# Patient Record
Sex: FEMALE | Race: WHITE | NOT HISPANIC OR LATINO | URBAN - NONMETROPOLITAN AREA
[De-identification: names, ages, dates, MRNs, and addresses within clinical notes are randomized per-mention and may not be internally consistent; named-entity substitution may affect disease eponyms.]

---

## 2017-02-24 ENCOUNTER — DOCTOR'S OFFICE (OUTPATIENT)
Dept: URBAN - NONMETROPOLITAN AREA CLINIC 1 | Facility: CLINIC | Age: 22
Setting detail: OPHTHALMOLOGY
End: 2017-02-24
Payer: COMMERCIAL

## 2017-02-24 ENCOUNTER — OPTICAL OFFICE (OUTPATIENT)
Dept: URBAN - NONMETROPOLITAN AREA CLINIC 4 | Facility: CLINIC | Age: 22
Setting detail: OPHTHALMOLOGY
End: 2017-02-24
Payer: COMMERCIAL

## 2017-02-24 DIAGNOSIS — H52.223: ICD-10-CM

## 2017-02-24 DIAGNOSIS — H52.13: ICD-10-CM

## 2017-02-24 PROCEDURE — V2020 VISION SVCS FRAMES PURCHASES: HCPCS | Performed by: OPTOMETRIST

## 2017-02-24 PROCEDURE — V2103 SPHEROCYLINDR 4.00D/12-2.00D: HCPCS | Performed by: OPTOMETRIST

## 2017-02-24 PROCEDURE — 92014 COMPRE OPH EXAM EST PT 1/>: CPT | Performed by: OPTOMETRIST

## 2017-02-24 PROCEDURE — V2750 ANTI-REFLECTIVE COATING: HCPCS | Performed by: OPTOMETRIST

## 2017-02-24 PROCEDURE — V2784 LENS POLYCARB OR EQUAL: HCPCS | Performed by: OPTOMETRIST

## 2017-02-24 ASSESSMENT — REFRACTION_OUTSIDERX
OS_CYLINDER: -0.50
OD_SPHERE: -0.25
OS_AXIS: 080
OD_VA3: 20/
OS_VA1: 20/20
OD_AXIS: 090
OS_VA3: 20/
OS_VA2: 20/
OD_VA1: 20/20
OD_CYLINDER: -0.50
OU_VA: 20/20
OS_SPHERE: -0.25
OD_VA2: 20/

## 2017-02-24 ASSESSMENT — SPHEQUIV_DERIVED
OD_SPHEQUIV: -0.5
OS_SPHEQUIV: -0.625

## 2017-02-24 ASSESSMENT — AXIALLENGTH_DERIVED
OS_AL: 22.5939
OD_AL: 22.5493

## 2017-02-24 ASSESSMENT — REFRACTION_CURRENTRX
OS_OVR_VA: 20/
OS_OVR_VA: 20/
OS_CYLINDER: -0.75
OS_SPHERE: PLANO
OD_AXIS: 90
OD_OVR_VA: 20/
OD_SPHERE: -0.25
OS_AXIS: 79
OD_VPRISM_DIRECTION: SV
OS_VPRISM_DIRECTION: SV
OD_OVR_VA: 20/
OS_OVR_VA: 20/
OD_CYLINDER: -0.50
OD_OVR_VA: 20/

## 2017-02-24 ASSESSMENT — CONFRONTATIONAL VISUAL FIELD TEST (CVF)
OD_FINDINGS: FULL
OS_FINDINGS: FULL

## 2017-02-24 ASSESSMENT — REFRACTION_AUTOREFRACTION
OD_AXIS: 88
OS_CYLINDER: -0.25
OS_SPHERE: -0.50
OD_SPHERE: -0.25
OS_AXIS: 82
OD_CYLINDER: -0.50

## 2017-02-24 ASSESSMENT — REFRACTION_MANIFEST
OD_VA3: 20/
OS_VA1: 20/
OS_VA3: 20/
OU_VA: 20/
OD_VA3: 20/
OS_VA3: 20/
OD_VA1: 20/
OS_VA2: 20/
OD_VA2: 20/
OS_VA2: 20/
OD_VA1: 20/
OS_VA1: 20/
OU_VA: 20/
OD_VA2: 20/

## 2017-02-24 ASSESSMENT — KERATOMETRY
OS_K2POWER_DIOPTERS: 47.50
OD_K2POWER_DIOPTERS: 47.50
OS_AXISANGLE_DEGREES: 035
OD_AXISANGLE_DEGREES: 143
OD_K1POWER_DIOPTERS: 46.50
OS_K1POWER_DIOPTERS: 46.50

## 2017-02-24 ASSESSMENT — VISUAL ACUITY
OS_BCVA: 20/20-1
OD_BCVA: 20/20-1

## 2017-11-07 ENCOUNTER — OFFICE VISIT (OUTPATIENT)
Dept: URGENT CARE | Facility: CLINIC | Age: 22
End: 2017-11-07
Payer: COMMERCIAL

## 2017-11-07 PROCEDURE — 99203 OFFICE O/P NEW LOW 30 MIN: CPT

## 2017-11-07 PROCEDURE — S9088 SERVICES PROVIDED IN URGENT: HCPCS

## 2017-11-08 NOTE — PROGRESS NOTES
Assessment  1  Acute upper respiratory infection (465 9) (J06 9)    Plan  Acute upper respiratory infection    · Avoid exposure to cigarette smoke ; Status:Complete;   Done: 44OQC5303   · Drink at least 6 glasses of water or juice a day ; Status:Complete;   Done: 70XUV4159   · Good hand washing is one of the best ways to control the spread of germs ;  Status:Complete;   Done: 85GHN7422   · The following home treatments may soothe a sore throat ; Status:Complete;   Done:  71YFI0801   · Use a cough medicine to help you get adequate rest ; Status:Complete;   Done:  [id number]    Discussion/Summary  Discussion Summary:   Mucinex or robitussin to thin the mucus  Sinus massage, and eustachian tube massage, as demonstrated every 2-3 hours  Medication Side Effects Reviewed: Possible side effects of new medications were reviewed with the patient/guardian today  Understands and agrees with treatment plan: The treatment plan was reviewed with the patient/guardian  The patient/guardian understands and agrees with the treatment plan   Follow Up Instructions: Follow Up with your Primary Care Provider in 4-5 days  If your symptoms worsen, go to the nearest [place] [address] Emergency Department  Chief Complaint  1  Cold Symptoms   2  Sore Throat  Chief Complaint Free Text Note Form: Pressure in both ears, and sorethroat since sunday      History of Present Illness  Hospital Based Practices Required Assessment:   Abuse And Domestic Violence Screen    Yes, the patient is safe at home  -- The patient states no one is hurting them  Depression And Suicide Screen  No, the patient has not had thoughts of hurting themself  No, the patient has not felt depressed in the past 7 days  Prefered Language is  [place]  Primary Language is  English  Cold Symptoms:   Chris presents with complaints of gradual onset of constant episodes of moderate cold symptoms  Episodes started about 3 days ago     Associated symptoms include nasal congestion,-- runny nose,-- post nasal drainage,-- sore throat,-- dry cough,-- ear pain,-- fever-- and-- chills  Review of Systems  Focused-Female:   Constitutional: as noted in HPI    ENT: as noted in HPI  Cardiovascular: no complaints of slow or fast heart rate, no chest pain, no palpitations, no leg claudication or lower extremity edema  Respiratory: no complaints of shortness of breath, no wheezing, no dyspnea on exertion, no orthopnea or PND  Past Medical History  1  History of  (Q02 33) (N52 124)  Active Problems And Past Medical History Reviewed: The active problems and past medical history were reviewed and updated today  Family History  Family History    1  No pertinent family history  Family History Reviewed: The family history was reviewed and updated today  Social History   · Active smoker (305 1) (F17 200)  Social History Reviewed: The social history was reviewed and updated today  Surgical History  1  Denied: History Of Prior Surgery  Surgical History Reviewed: The surgical history was reviewed and updated today  Current Meds   1  Adderall 20 MG Oral Tablet; Therapy: (0664 313 06 34) to Recorded   2  Fetzima 40 MG Oral Capsule Extended Release 24 Hour; Therapy: (0664 313 06 34) to Recorded   3  Flexeril 10 MG TABS; Therapy: (0664 313 06 34) to Recorded   4  Folic Acid 1 MG Oral Tablet; Therapy: (0664 313 06 34) to Recorded   5  LaMICtal 100 MG Oral Tablet; Therapy: (0664 313 06 34) to Recorded  Medication List Reviewed: The medication list was reviewed and updated today  Allergies  1   No Known Drug Allergies    Vitals  Signs   Recorded: 93KKD4826 10:41AM   Temperature: 98 F  Heart Rate: 500  Systolic: 520  Diastolic: 73  Height: 5 ft 9 in  Weight: 130 lb 4 oz  BMI Calculated: 19 23  BSA Calculated: 1 72  O2 Saturation: 100  Pain Scale: 8    Physical Exam    Constitutional   General appearance: No acute distress, well appearing and well nourished  Ears, Nose, Mouth, and Throat   External inspection of ears and nose: Normal     Otoscopic examination: Tympanic membranes translucent with normal light reflex  Canals patent without erythema  Nasal mucosa, septum, and turbinates: Normal without edema or erythema  Oropharynx: Normal with no erythema, edema, exudate or lesions  Pulmonary   Respiratory effort: No increased work of breathing or signs of respiratory distress  Auscultation of lungs: Clear to auscultation  Cardiovascular   Auscultation of heart: Normal rate and rhythm, normal S1 and S2, without murmurs  Lymphatic   Palpation of lymph nodes in neck: No lymphadenopathy  Message  Return to work or school:   Jacki Yañez is under my professional care   She was seen in my office on 11/7/2017             Signatures   Electronically signed by : DAMARIS Palacios ; Nov 7 2017 11:34AM EST                       (Author)

## 2017-11-26 ENCOUNTER — OFFICE VISIT (OUTPATIENT)
Dept: URGENT CARE | Facility: CLINIC | Age: 22
End: 2017-11-26
Payer: COMMERCIAL

## 2017-11-26 PROCEDURE — 99203 OFFICE O/P NEW LOW 30 MIN: CPT

## 2017-11-26 PROCEDURE — S9088 SERVICES PROVIDED IN URGENT: HCPCS

## 2017-11-27 NOTE — PROGRESS NOTES
Assessment    1  Acute pharyngitis (462) (J02 9)    Plan  Acute pharyngitis    · Amoxicillin 400 MG/5ML Oral Suspension Reconstituted; TAKE 10 ML EVERY 12HOURS UNTIL GONE   · Lidocaine Viscous 2 % Mouth/Throat Solution; USE 5ML EVERY 2 HOURS ASNEEDED    Discussion/Summary  Discussion Summary:   In office strep test was positive  Take medication as prescribed  he could also try taking the viscous lidocaine, gargle and spit  If any worsening of symptoms follow-up with primary care provider for re-evaluation  In any distress please go to emergency room without delay  Medication Side Effects Reviewed: Possible side effects of new medications were reviewed with the patient/guardian today  Understands and agrees with treatment plan: The treatment plan was reviewed with the patient/guardian  The patient/guardian understands and agrees with the treatment plan   Counseling Documentation With Imm: The patient was counseled regarding instructions for management  Follow Up Instructions: Follow Up with your Primary Care Provider in 3-5 days  If your symptoms worsen, go to the nearest [facility] Emergency Department  Chief Complaint    1  Sore Throat  Chief Complaint Free Text Note Form: Patient states started with sore throat yesterday      History of Present Illness  HPI: 38-year-old female presents with a sore throat times 2 days  She states that her pain is currently a 9/10 and she has had elevated temperatures ranging from 100-101 F  she denies any chest pain, shortness of breath, difficulty breathing, abdominal pain, nausea /vomiting /diarrhea  She denies any sick contacts  She denies cough   Hospital Based Practices Required Assessment: Reason DV Screen not done: family at bedside   Depression And Suicide Screen  Reason suicide screen not done: family at bedside  Prefered Language is  english  Primary Language is  english  Review of Systems  Focused-Female:  Constitutional: fever, but-- no chills  ENT: no nosebleeds-- and-- no nasal discharge  Cardiovascular: no chest pain-- and-- no palpitations  Respiratory: no shortness of breath,-- no cough-- and-- no wheezing  Gastrointestinal: no nausea-- and-- no vomiting  ROS Reviewed:   ROS reviewed  Active Problems  1  Acute upper respiratory infection (465 9) (J06 9)   2  Sore throat (462) (J02 9)    Past Medical History  1  History of  (V45 89) (U60 681)   2  History of depression (V11 8) (Z86 59)   3  History of restless legs syndrome (V12 49) (Z86 69)   4  History of Narcolepsy (347 00) (G47 419)  Active Problems And Past Medical History Reviewed: The active problems and past medical history were reviewed and updated today  Family History  Family History    1  No pertinent family history  Family History Reviewed: The family history was reviewed and updated today  Social History   · Active smoker (305 1) (F17 200)  Social History Reviewed: The social history was reviewed and updated today  The social history was reviewed and is unchanged  Surgical History    1  History of  Section   2  Denied: History Of Prior Surgery   3  History of Nose Surgery  Surgical History Reviewed: The surgical history was reviewed and updated today  Current Meds   1  Adderall 20 MG Oral Tablet; Therapy: ([phone]) to Recorded   2  Fetzima 40 MG Oral Capsule Extended Release 24 Hour; Therapy: ([phone]) to Recorded   3  Flexeril 10 MG TABS; Therapy: ([phone]) to Recorded   4  Folic Acid 1 MG Oral Tablet; Therapy: ([phone]) to Recorded   5  LaMICtal 100 MG Oral Tablet; Therapy: ([phone]) to Recorded   6  TraMADol HCl - 50 MG Oral Tablet; Therapy: (Recorded:83Dte4314) to Recorded  Medication List Reviewed: The medication list was reviewed and updated today  Allergies    1   No Known Drug Allergies    Vitals  Signs   Recorded: 15HBM5905 02:16PM   Temperature: 99 3 F, Tympanic  Heart Rate: 136  Respiration: 20  Systolic: 99, RUE, Sitting  Diastolic: 60, RUE, Sitting  Height: 5 ft 9 in  Weight: 128 lb 8 oz  BMI Calculated: 18 98  BSA Calculated: 1 71  O2 Saturation: 100, RA  Pain Scale: 9    Physical Exam   Constitutional  General appearance: No acute distress, well appearing and well nourished  Eyes  Conjunctiva and lids: No swelling, erythema or discharge  Pupils and irises: Equal, round and reactive to light  Ears, Nose, Mouth, and Throat  External inspection of ears and nose: Normal    Otoscopic examination: Tympanic membranes translucent with normal light reflex  Canals patent without erythema  Nasal mucosa, septum, and turbinates: Normal without edema or erythema  Oropharynx: Abnormal  -- bilateral tonsillar exudates and erythema in posterior oropharynx  Pulmonary  Respiratory effort: No increased work of breathing or signs of respiratory distress  Auscultation of lungs: Clear to auscultation  Cardiovascular  Palpation of heart: Normal PMI, no thrills  Auscultation of heart: Normal rate and rhythm, normal S1 and S2, without murmurs  Results/Data  Lab Studies Reviewed:  In office strep is positive      Signatures   Electronically signed by : Kit Sullivan, [facility]; Nov 26 2017  3:18PM EST                       (Author)    Electronically signed by : BENOIT Garcia ; Nov 26 2017  4:02PM EST

## 2018-01-18 NOTE — MISCELLANEOUS
Message  Return to work or school:   Nu Rodriguez is under my professional care   She was seen in my office on 11/7/2017             Signatures   Electronically signed by : DAMARIS Wright ; Nov 7 2017 11:34AM EST                       (Author)

## 2018-04-03 ENCOUNTER — EVALUATION (OUTPATIENT)
Dept: PHYSICAL THERAPY | Facility: CLINIC | Age: 23
End: 2018-04-03
Payer: COMMERCIAL

## 2018-04-03 DIAGNOSIS — M22.2X1 PATELLOFEMORAL PAIN SYNDROME OF BOTH KNEES: Primary | ICD-10-CM

## 2018-04-03 DIAGNOSIS — M22.2X2 PATELLOFEMORAL PAIN SYNDROME OF BOTH KNEES: Primary | ICD-10-CM

## 2018-04-03 PROCEDURE — 97014 ELECTRIC STIMULATION THERAPY: CPT | Performed by: PHYSICAL THERAPIST

## 2018-04-03 PROCEDURE — G8991 OTHER PT/OT GOAL STATUS: HCPCS | Performed by: PHYSICAL THERAPIST

## 2018-04-03 PROCEDURE — G8990 OTHER PT/OT CURRENT STATUS: HCPCS | Performed by: PHYSICAL THERAPIST

## 2018-04-03 PROCEDURE — 97162 PT EVAL MOD COMPLEX 30 MIN: CPT | Performed by: PHYSICAL THERAPIST

## 2018-04-03 PROCEDURE — 97110 THERAPEUTIC EXERCISES: CPT | Performed by: PHYSICAL THERAPIST

## 2018-04-03 RX ORDER — TRAMADOL HYDROCHLORIDE 50 MG/1
50 TABLET ORAL EVERY 6 HOURS PRN
COMMUNITY
End: 2018-04-14

## 2018-04-03 RX ORDER — LAMOTRIGINE 100 MG/1
25 TABLET ORAL DAILY
COMMUNITY
End: 2018-04-14

## 2018-04-03 RX ORDER — CYCLOBENZAPRINE HCL 10 MG
10 TABLET ORAL 3 TIMES DAILY PRN
COMMUNITY

## 2018-04-03 RX ORDER — FOLIC ACID 1 MG/1
TABLET ORAL DAILY
COMMUNITY
End: 2018-04-14

## 2018-04-03 RX ORDER — DEXTROAMPHETAMINE SACCHARATE, AMPHETAMINE ASPARTATE MONOHYDRATE, DEXTROAMPHETAMINE SULFATE AND AMPHETAMINE SULFATE 5; 5; 5; 5 MG/1; MG/1; MG/1; MG/1
20 CAPSULE, EXTENDED RELEASE ORAL EVERY MORNING
COMMUNITY
End: 2019-12-27

## 2018-04-03 NOTE — LETTER
2018    Laura Duran    Patient: Farida Lofton   YOB: 1995   Date of Visit: 4/3/2018     Encounter Diagnosis     ICD-10-CM    1  Patellofemoral pain syndrome of both knees M22 2X1     M22 2X2        Dear Dr Eder Patricia:    Please review the attached Plan of Care from [facility] recent visit  Please verify that you agree therapy should continue by signing the attached document and sending it back to our office  If you have any questions or concerns, please don't hesitate to call  Sincerely,    Anayeli Yanes, PT      Referring Provider:      I certify that I have read the below Plan of Care and certify the need for these services furnished under this plan of treatment while under my care  [facility] Facsimile: [phone]          PT Evaluation     Today's date: 4/3/2018  Patient name: Farida Lofton  : 1995  MRN: [id number]  Referring provider: Caitlin Jones  Dx:   Encounter Diagnosis     ICD-10-CM    1  Patellofemoral pain syndrome of both knees M22 2X1     M22 2X2                   Assessment  Impairments: abnormal gait, abnormal muscle firing, abnormal muscle tone, abnormal movement, activity intolerance, impaired physical strength, lacks appropriate home exercise program, pain with function and weight-bearing intolerance  Functional limitations: Difficulty standing over 1 hour, Difficulty with stairs, difficulty squatting, difficulty with school  Assessment details: Pt  Is a pleasant 23 y o  Female presenting to PT with cc of chronic B anterior knee pain  She is demonstrating objective deficits at IE including reduced strength, mm balance, and joint mechanics with subsequent reduced functional performance  Pt  Demonstrates ss consistent with B PFP secondary to rotational dysfunction causing valgus collapse and patellar maltracking  Pt  Foot pronation and supination are WNL   Pt  Denies any low back impairments and B hip/innominate positioning WNL  Pt  Would benefit from PT interventions to promote improved functional performance and BLE mechanics to reduce impairments  Understanding of Dx/Px/POC: good   Prognosis: good    Goals  ST weeks  -Pt  Will be able to perform 10 squats without pain  -Pt  Will demonstrate ability to perform S/L SLR, showing improved functional hip abduction     LT weeks  -Pt  Will be able to perform DL jump 20x without pain  -Pt  Will be I with HEP at DC  -Pt  Will be pain free with all ADLs      Plan  Planned modality interventions: cryotherapy, unattended electrical stimulation and thermotherapy: hydrocollator packs  Planned therapy interventions: abdominal trunk stabilization, activity modification, balance/weight bearing training, Mendoza taping, manual therapy, joint mobilization, neuromuscular re-education, therapeutic exercise, therapeutic activities, functional ROM exercises and home exercise program  Frequency: 2x week  Duration in weeks: 6  Treatment plan discussed with: patient        Subjective Evaluation    History of Present Illness  Onset date: Approx 3 years ago  Mechanism of injury: Pt  Presents to PT with cc of chronic B anterior knee pain  She notes pain began approx 3 years ago but denies any particular NIRANJAN or trauma  Pt  Visited ortho to address and was referred to ortho surg and has since been referred to PT with eval/treat with quad/hip strengthening  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[employer] and states classes and prolonged walking agitate knees  She also notes that going down stairs seems to be the most troublesome  Pt  Has 10 yo son she is caring for and states bending and lifting him is difficult  She hopes to return to PLOF and normal daily activities pain free     Pain  Current pain ratin  At best pain ratin  At worst pain ratin  Location: B Knees  Quality: sharp, pressure and grinding  Relieving factors: ice and heat  Aggravating factors: stair climbing and standing          Objective     Palpation   Left   Tenderness of the vastus medialis  Right Tenderness of the vastus medialis  Tenderness   Left Knee   Tenderness in the ITB, lateral patella, patellar tendon and quadriceps tendon  Right Knee   Tenderness in the ITB, lateral patella, medial patella and quadriceps tendon  Neurological Testing     Sensation     Knee   Left Knee   Intact: light touch    Right Knee   Intact: light touch     Reflexes   Left   Patellar (L4): normal (2+)    Right   Patellar (L4): normal (2+)    Active Range of Motion   Left Knee   Normal active range of motion    Right Knee   Normal active range of motion    Mobility   Patellar Mobility:   Left Knee   WFL: medial    Hypermobile: left lateral and left superior    Hypomobile: left inferior    Right Knee   WFL: medial  Hypermobile: lateral and superior   Hypomobile: inferior     Patellar Static Positioning   Left Knee: lateral tilt and kyle  Right Knee: lateral tilt and kyle    Strength/Myotome Testing     Left Knee   Flexion: 4  Prone flexion: 4+  Extension: 4+  Quadriceps contraction: fair    Right Knee   Flexion: 4-  Prone flexion: 4  Extension: 4+  Quadriceps contraction: fair    Tests     Left Knee   Positive patellar apprehension, patellar compression and patella-femoral grind  Right Knee   Positive patellar apprehension, patellar compression and patella-femoral grind  Functional Assessment   Squat   Left valgus, left tibial anterior translation beyond toes, right valgus and right tibial anterior translation beyond toes  Forward Step Down 8"   Left Leg  Pain and valgus  Right Leg  Pain and valgus         Flowsheet Rows    Flowsheet Row Most Recent Value   PT/OT G-Codes   Current Score  46   Projected Score  60   FOTO information reviewed  Yes   Assessment Type  Evaluation   G code set  Other PT/OT Primary   Other PT Primary Current Status ()  CK   Other PT Primary Goal Status ()  CK Precautions: None    Daily Treatment Diary     Manual  4/3            PF Mobz -            IASTM -                                                       Exercise Diary  4/3            SLR  20x            S/L Abduction 20x            Clamshells 20x            Brdiges 20x                                                                                                                                                                                                               Mendoza Tape LY @ Start                Modalities  4/3            HV with CP 15 min

## 2018-04-03 NOTE — PROGRESS NOTES
PT Evaluation     Today's date: 4/3/2018  Patient name: [place]  : 1995  MRN: [id number]  Referring provider: Hafsa Cope  Dx:   Encounter Diagnosis     ICD-10-CM    1  Patellofemoral pain syndrome of both knees M22 2X1     M22 2X2                   Assessment  Impairments: abnormal gait, abnormal muscle firing, abnormal muscle tone, abnormal movement, activity intolerance, impaired physical strength, lacks appropriate home exercise program, pain with function and weight-bearing intolerance  Functional limitations: Difficulty standing over 1 hour, Difficulty with stairs, difficulty squatting, difficulty with school  Assessment details: Pt  Is a pleasant 23 y o  Female presenting to PT with cc of chronic B anterior knee pain  She is demonstrating objective deficits at IE including reduced strength, mm balance, and joint mechanics with subsequent reduced functional performance  Pt  Demonstrates ss consistent with B PFP secondary to rotational dysfunction causing valgus collapse and patellar maltracking  Pt  Foot pronation and supination are WNL  Pt  Denies any low back impairments and B hip/innominate positioning WNL  Pt  Would benefit from PT interventions to promote improved functional performance and BLE mechanics to reduce impairments  Understanding of Dx/Px/POC: good   Prognosis: good    Goals  ST weeks  -Pt  Will be able to perform 10 squats without pain  -Pt  Will demonstrate ability to perform S/L SLR, showing improved functional hip abduction     LT weeks  -Pt  Will be able to perform DL jump 20x without pain  -Pt  Will be I with HEP at DC  -Pt   Will be pain free with all ADLs      Plan  Planned modality interventions: cryotherapy, unattended electrical stimulation and thermotherapy: hydrocollator packs  Planned therapy interventions: abdominal trunk stabilization, activity modification, balance/weight bearing training, Mendoza taping, manual therapy, joint mobilization, neuromuscular re-education, therapeutic exercise, therapeutic activities, functional ROM exercises and home exercise program  Frequency: 2x week  Duration in weeks: 6  Treatment plan discussed with: patient        Subjective Evaluation    History of Present Illness  Onset date: Approx 3 years ago  Mechanism of injury: Pt  Presents to PT with cc of chronic B anterior knee pain  She notes pain began approx 3 years ago but denies any particular NIRANJAN or trauma  Pt  Visited ortho to address and was referred to ortho surg and has since been referred to PT with eval/treat with quad/hip strengthening  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 and prolonged walking agitate knees  She also notes that going down stairs seems to be the most troublesome  Pt  Has 8 yo son she is caring for and states bending and lifting him is difficult  She hopes to return to PLOF and normal daily activities pain free  Pain  Current pain ratin  At best pain ratin  At worst pain ratin  Location: B Knees  Quality: sharp, pressure and grinding  Relieving factors: ice and heat  Aggravating factors: stair climbing and standing          Objective     Palpation   Left   Tenderness of the vastus medialis  Right Tenderness of the vastus medialis  Tenderness   Left Knee   Tenderness in the ITB, lateral patella, patellar tendon and quadriceps tendon  Right Knee   Tenderness in the ITB, lateral patella, medial patella and quadriceps tendon       Neurological Testing     Sensation     Knee   Left Knee   Intact: light touch    Right Knee   Intact: light touch     Reflexes   Left   Patellar (L4): normal (2+)    Right   Patellar (L4): normal (2+)    Active Range of Motion   Left Knee   Normal active range of motion    Right Knee   Normal active range of motion    Mobility   Patellar Mobility:   Left Knee   WFL: medial    Hypermobile: left lateral and left superior    Hypomobile: left inferior    Right Knee WFL: medial  Hypermobile: lateral and superior   Hypomobile: inferior     Patellar Static Positioning   Left Knee: lateral tilt and kyle  Right Knee: lateral tilt and kyle    Strength/Myotome Testing     Left Knee   Flexion: 4  Prone flexion: 4+  Extension: 4+  Quadriceps contraction: fair    Right Knee   Flexion: 4-  Prone flexion: 4  Extension: 4+  Quadriceps contraction: fair    Tests     Left Knee   Positive patellar apprehension, patellar compression and patella-femoral grind  Right Knee   Positive patellar apprehension, patellar compression and patella-femoral grind  Functional Assessment   Squat   Left valgus, left tibial anterior translation beyond toes, right valgus and right tibial anterior translation beyond toes  Forward Step Down 8"   Left Leg  Pain and valgus  Right Leg  Pain and valgus         Flowsheet Rows    Flowsheet Row Most Recent Value   PT/OT G-Codes   Current Score  46   Projected Score  60   FOTO information reviewed  Yes   Assessment Type  Evaluation   G code set  Other PT/OT Primary   Other PT Primary Current Status ()  CK   Other PT Primary Goal Status ()  CK          Precautions: None    Daily Treatment Diary     Manual  4/3            PF Mobz -            IASTM -                                                       Exercise Diary  4/3            SLR  20x            S/L Abduction 20x            Clamshells 20x            Brdiges 20x                                                                                                                                                                                                               Mendoza Tape LY @ Start                Modalities  4/3            HV with CP 15 min

## 2018-04-04 ENCOUNTER — TRANSCRIBE ORDERS (OUTPATIENT)
Dept: PHYSICAL THERAPY | Facility: CLINIC | Age: 23
End: 2018-04-04

## 2018-04-04 DIAGNOSIS — M22.2X1 PATELLOFEMORAL DISORDER OF RIGHT KNEE: ICD-10-CM

## 2018-04-04 DIAGNOSIS — M22.2X2 PATELLOFEMORAL DISORDER OF LEFT KNEE: Primary | ICD-10-CM

## 2018-04-05 ENCOUNTER — OFFICE VISIT (OUTPATIENT)
Dept: PHYSICAL THERAPY | Facility: CLINIC | Age: 23
End: 2018-04-05
Payer: COMMERCIAL

## 2018-04-05 DIAGNOSIS — M22.2X1 PATELLOFEMORAL PAIN SYNDROME OF BOTH KNEES: Primary | ICD-10-CM

## 2018-04-05 DIAGNOSIS — M22.2X2 PATELLOFEMORAL PAIN SYNDROME OF BOTH KNEES: Primary | ICD-10-CM

## 2018-04-05 PROCEDURE — 97110 THERAPEUTIC EXERCISES: CPT

## 2018-04-05 PROCEDURE — 97140 MANUAL THERAPY 1/> REGIONS: CPT

## 2018-04-05 PROCEDURE — 97014 ELECTRIC STIMULATION THERAPY: CPT

## 2018-04-05 PROCEDURE — 97112 NEUROMUSCULAR REEDUCATION: CPT

## 2018-04-05 NOTE — PROGRESS NOTES
Daily Note     Today's date: 2018  Patient name: [place]  : 1995  MRN: [id number]  Referring provider: Luise Severance, PA-C  Dx:   Encounter Diagnosis     ICD-10-CM    1  Patellofemoral pain syndrome of both knees M22 2X1     M22 2X2        Start Time: 5901  Stop Time: 1836  Total time in clinic (min): 69 minutes    Subjective: "Both of my knee are pretty sore today "      Objective: See treatment diary below      Assessment:  Patient noted generalized discomfort with IASTM and noted particular selective tissue tension with ITB ROM  Patient would benefit from continued skilled PT intervention to address her remaining deficits  Plan: Continue PT as per plan of care and patient tolerance      Precautions: None     Daily Treatment Diary      Manual  4/3  4/5                   PF Mobz -  AS                   IASTM -  AS                                                                                                 Exercise Diary  4/3  4/5                   SLR  20x  20x                   S/L Abduction 20x  20x                   Clamshells 20x  20x                   Brdiges 20x  30x2''                   fire hydrants (static)    60''                   static clamshell    60''                   static scissor    60''                    S/L ITB stretch    3x30''                    R bike    5'                                                                                                                                                                                                                                                                   Mendoza Tape LY @ Start  NP                         Modalities  4/3  4/5                   HV with CP 15 min  15'

## 2018-04-09 ENCOUNTER — APPOINTMENT (OUTPATIENT)
Dept: PHYSICAL THERAPY | Facility: CLINIC | Age: 23
End: 2018-04-09
Payer: COMMERCIAL

## 2018-04-12 ENCOUNTER — OFFICE VISIT (OUTPATIENT)
Dept: PHYSICAL THERAPY | Facility: CLINIC | Age: 23
End: 2018-04-12
Payer: COMMERCIAL

## 2018-04-12 DIAGNOSIS — M22.2X2 PATELLOFEMORAL PAIN SYNDROME OF BOTH KNEES: Primary | ICD-10-CM

## 2018-04-12 DIAGNOSIS — M22.2X1 PATELLOFEMORAL PAIN SYNDROME OF BOTH KNEES: Primary | ICD-10-CM

## 2018-04-12 PROCEDURE — 97140 MANUAL THERAPY 1/> REGIONS: CPT | Performed by: PHYSICAL THERAPIST

## 2018-04-12 PROCEDURE — 97110 THERAPEUTIC EXERCISES: CPT | Performed by: PHYSICAL THERAPIST

## 2018-04-12 NOTE — PROGRESS NOTES
Daily Note     Today's date: 2018  Patient name: [place]  : 1995  MRN: [id number]  Referring provider: Caitlin Jones PA-C  Dx:   Encounter Diagnosis     ICD-10-CM    1  Patellofemoral pain syndrome of both knees M22 2X1     M22 2X2                   Subjective: Pt  Notes B knees continue to be sore  She states some pain over fibular head which when palpated gives some N/T  Objective: See treatment diary below      Assessment:  Pt  Demonstrates ss of peroneal paresthesias due to exercise putting strain on mm  Pt  Symptoms reduce immediately after palpation  Pt  Responded well to quad stretching and STM as well as Mendoza taping to improve patellar tracking and superior positioning  Plan: Continue PT as per plan of care and patient tolerance      Precautions: None     Daily Treatment Diary      Manual  4/3  4/5  4/12                 PF Mobz -  AS  5 min                 IASTM -  AS  5 min                                                                                               Exercise Diary  4/3  4/5  4/12                 SLR  20x  20x  30x                 S/L Abduction 20x  20x  20x                 Clamshells 20x  20x  30x                 Brdiges 20x  30x2''  30x2" with band and Bosu                 fire hydrants (static)    60''  60"                 static clamshell    60''  30x, or                 static scissor    60''  -                  S/L ITB stretch    3x30''  3x30"                  R bike    5'  8'                  quad Stretch - - 4x30"                                                                                                                                                                                                                                         Mendoza Tape LY @ Start  NP  @ end                       Modalities  4/3  4/5  4/12                 HV with CP 15 min  15'  -

## 2018-04-14 ENCOUNTER — OFFICE VISIT (OUTPATIENT)
Dept: URGENT CARE | Facility: CLINIC | Age: 23
End: 2018-04-14
Payer: COMMERCIAL

## 2018-04-14 VITALS
BODY MASS INDEX: 20.17 KG/M2 | SYSTOLIC BLOOD PRESSURE: 107 MMHG | HEIGHT: 69 IN | TEMPERATURE: 98.3 F | HEART RATE: 132 BPM | OXYGEN SATURATION: 99 % | RESPIRATION RATE: 16 BRPM | WEIGHT: 136.2 LBS | DIASTOLIC BLOOD PRESSURE: 74 MMHG

## 2018-04-14 DIAGNOSIS — J02.9 SORE THROAT: Primary | ICD-10-CM

## 2018-04-14 LAB — S PYO AG THROAT QL: NEGATIVE

## 2018-04-14 PROCEDURE — 99213 OFFICE O/P EST LOW 20 MIN: CPT | Performed by: FAMILY MEDICINE

## 2018-04-14 PROCEDURE — S9088 SERVICES PROVIDED IN URGENT: HCPCS | Performed by: FAMILY MEDICINE

## 2018-04-14 RX ORDER — FOLIC ACID 1 MG/1
TABLET ORAL
COMMUNITY
End: 2018-04-14

## 2018-04-14 RX ORDER — CYCLOBENZAPRINE HCL 10 MG
TABLET ORAL
COMMUNITY
End: 2018-04-14

## 2018-04-14 RX ORDER — CYCLOBENZAPRINE HCL 10 MG
10 TABLET ORAL
COMMUNITY
End: 2018-04-14

## 2018-04-14 RX ORDER — LAMOTRIGINE 100 MG/1
TABLET ORAL
COMMUNITY
End: 2019-12-27

## 2018-04-14 RX ORDER — TRAMADOL HYDROCHLORIDE 50 MG/1
50 TABLET ORAL
COMMUNITY
Start: 2018-04-12

## 2018-04-14 RX ORDER — LEVOMILNACIPRAN HYDROCHLORIDE 40 MG/1
CAPSULE, EXTENDED RELEASE ORAL
COMMUNITY
Start: 2018-04-10

## 2018-04-14 RX ORDER — ALBUTEROL SULFATE 2.5 MG/3ML
2.5 SOLUTION RESPIRATORY (INHALATION)
COMMUNITY
Start: 2016-10-03

## 2018-04-14 RX ORDER — TRETINOIN 0.5 MG/G
CREAM TOPICAL
COMMUNITY
Start: 2017-04-12 | End: 2019-12-27

## 2018-04-14 RX ORDER — FOLIC ACID 1 MG/1
1 TABLET ORAL
COMMUNITY

## 2018-04-14 RX ORDER — LAMOTRIGINE 150 MG/1
150 TABLET ORAL DAILY
Refills: 2 | COMMUNITY
Start: 2018-04-09

## 2018-04-14 RX ORDER — ALBUTEROL SULFATE 90 UG/1
2 AEROSOL, METERED RESPIRATORY (INHALATION)
COMMUNITY
Start: 2016-10-03

## 2018-04-14 RX ORDER — DEXTROAMPHETAMINE SACCHARATE, AMPHETAMINE ASPARTATE, DEXTROAMPHETAMINE SULFATE AND AMPHETAMINE SULFATE 5; 5; 5; 5 MG/1; MG/1; MG/1; MG/1
TABLET ORAL
COMMUNITY
End: 2019-12-27

## 2018-04-14 NOTE — PROGRESS NOTES
Assessment/Plan:    Recommend supportive care, fluids and rest   Warm saltwater gargles or gargle with Listerine  Follow up with family doctor if no better in 3-5 days  Diagnoses and all orders for this visit:    Sore throat  -     POCT rapid strepA    Other orders  -     Acetaminophen 500 MG; Take 500 mg by mouth  -     albuterol (PROVENTIL HFA,VENTOLIN HFA) 90 mcg/act inhaler; Inhale 2 puffs  -     albuterol (2 5 mg/3 mL) 0 083 % nebulizer solution; Inhale 2 5 mg  -     Levomilnacipran HCl ER (FETZIMA) 40 MG CP24; Take by mouth  -     Discontinue: cyclobenzaprine (FLEXERIL) 10 mg tablet; Take by mouth  -     lamoTRIgine (LaMICtal) 150 MG tablet; Take 150 mg by mouth daily  -     FETZIMA 40 MG CP24;   -     tretinoin (REFISSA) 0 05 % cream; Apply topically  -     amphetamine-dextroamphetamine (ADDERALL, 20MG,) 20 mg tablet; Take by mouth  -     Discontinue: cyclobenzaprine (FLEXERIL) 10 mg tablet; Take 10 mg by mouth  -     Discontinue: folic acid (FOLVITE) 1 mg tablet; Take by mouth  -     folic acid (FOLVITE) 1 mg tablet; Take 1 mg by mouth  -     lamoTRIgine (LAMICTAL) 100 mg tablet; Take by mouth  -     traMADol (ULTRAM) 50 mg tablet; Take 50 mg by mouth          Subjective:      Patient ID: Malika Chery is a 23 y o  female  Patient presents with:  Sore Throat: pt c/o sore throat times 2 days  The following portions of the patient's history were reviewed and updated as appropriate: allergies, current medications, past family history, past medical history, past social history, past surgical history and problem list     Review of Systems   Constitutional: Negative  HENT: Positive for sore throat  Negative for congestion, ear discharge, ear pain, facial swelling, mouth sores, postnasal drip, sinus pain and sinus pressure  Eyes: Negative  Respiratory: Negative  Cardiovascular: Negative  Gastrointestinal: Negative  Endocrine: Negative  Genitourinary: Negative  Musculoskeletal: Negative  Skin: Negative  Allergic/Immunologic: Negative  Neurological: Negative  Hematological: Negative  Psychiatric/Behavioral: Negative  All other systems reviewed and are negative  Objective:      /74 (BP Location: Right arm, Patient Position: Sitting, Cuff Size: Adult)   Pulse (!) 132   Temp 98 3 °F (36 8 °C) (Tympanic)   Resp 16   Ht 5' 9" (1 753 m)   Wt 61 8 kg (136 lb 3 2 oz)   SpO2 99%   BMI 20 11 kg/m²          Physical Exam   Constitutional: She is oriented to person, place, and time  She appears well-developed and well-nourished  HENT:   Head: Normocephalic and atraumatic  Right Ear: External ear normal    Left Ear: External ear normal    Nose: Nose normal    Mouth/Throat: Oropharynx is clear and moist    Eyes: Conjunctivae and EOM are normal  Pupils are equal, round, and reactive to light  Neck: Normal range of motion  Neck supple  Cardiovascular: Normal rate, regular rhythm and normal heart sounds  Pulmonary/Chest: Effort normal and breath sounds normal    Abdominal: Soft  Bowel sounds are normal    Musculoskeletal: Normal range of motion  Neurological: She is alert and oriented to person, place, and time  She has normal reflexes  Skin: Skin is warm and dry  Psychiatric: She has a normal mood and affect  Her behavior is normal    Nursing note and vitals reviewed

## 2018-04-17 ENCOUNTER — APPOINTMENT (OUTPATIENT)
Dept: PHYSICAL THERAPY | Facility: CLINIC | Age: 23
End: 2018-04-17
Payer: COMMERCIAL

## 2018-04-20 ENCOUNTER — OFFICE VISIT (OUTPATIENT)
Dept: PHYSICAL THERAPY | Facility: CLINIC | Age: 23
End: 2018-04-20
Payer: COMMERCIAL

## 2018-04-20 DIAGNOSIS — M22.2X2 PATELLOFEMORAL PAIN SYNDROME OF BOTH KNEES: Primary | ICD-10-CM

## 2018-04-20 DIAGNOSIS — M22.2X1 PATELLOFEMORAL PAIN SYNDROME OF BOTH KNEES: Primary | ICD-10-CM

## 2018-04-20 PROCEDURE — 97140 MANUAL THERAPY 1/> REGIONS: CPT | Performed by: PHYSICAL THERAPIST

## 2018-04-20 PROCEDURE — 97110 THERAPEUTIC EXERCISES: CPT | Performed by: PHYSICAL THERAPIST

## 2018-04-23 NOTE — PROGRESS NOTES
Daily Note     Today's date: 2018  Patient name: [place]  : 1995  MRN: [id number]  Referring provider: Nichole Hinton PA-C  Dx:   Encounter Diagnosis     ICD-10-CM    1  Patellofemoral pain syndrome of both knees M22 2X1     M22 2X2                   Subjective: Pt  States Mendoza taping seems to have helped with daily pain, especially with stairs  She states compliance to HEP  Objective: See treatment diary below      Assessment:  [place] was progressed with PT interventions, focusing on appropriate technique and intensity  Pt  Required frequent cuing from therapist to complete  Pt  Would benefit from continued physical therapy to promote improved activity tolerance and function  Plan: Continue PT as per plan of care and patient tolerance      Precautions: None     Daily Treatment Diary      Manual  4/3  4/5  4/12  4/20               PF Mobz -  AS  5 min  5 min               IASTM -  AS  5 min  10 min                                                                                             Exercise Diary  4/3  4/5  4/12  4/20               SLR  20x  20x  30x  30x               S/L Abduction 20x  20x  20x  30x               Clamshells 20x  20x  30x  30x               Brdiges 20x  30x2''  30x2" with band and Bosu  30x2" with band and bosu               fire hydrants (static)    60''  60"  -               static clamshell    60''  30x, or  -               static scissor    60''  - "                S/L ITB stretch    3x30''  3x30"  4x30"                R bike    10'  8'  10'                quad Stretch - - 4x30"  4x30"                                                                                                                                                                                                                                       Mendoza Tape LY @ Start  NP  @ end @ end                     Modalities  4/3  4/5  4/12                 HV with CP 15 min  15'  -

## 2018-05-09 ENCOUNTER — OFFICE VISIT (OUTPATIENT)
Dept: PHYSICAL THERAPY | Facility: CLINIC | Age: 23
End: 2018-05-09
Payer: COMMERCIAL

## 2018-05-09 DIAGNOSIS — M22.2X1 PATELLOFEMORAL PAIN SYNDROME OF BOTH KNEES: Primary | ICD-10-CM

## 2018-05-09 DIAGNOSIS — M22.2X2 PATELLOFEMORAL PAIN SYNDROME OF BOTH KNEES: Primary | ICD-10-CM

## 2018-05-09 PROCEDURE — 97110 THERAPEUTIC EXERCISES: CPT | Performed by: PHYSICAL THERAPIST

## 2018-05-09 PROCEDURE — 97140 MANUAL THERAPY 1/> REGIONS: CPT | Performed by: PHYSICAL THERAPIST

## 2018-05-09 NOTE — PROGRESS NOTES
Daily Note     Today's date: 2018  Patient name: [place]  : 1995  MRN: [id number]  Referring provider: Zay Cornell PA-C  Dx:   Encounter Diagnosis     ICD-10-CM    1  Patellofemoral pain syndrome of both knees M22 2X1     M22 2X2                   Subjective: Pt states that she is still experiencing anterior and lateral knee pain that is made worse when sitting at school, walking, and navigating stairs  Pt notes being compliant with her HEP and that the exercises have been easy         Objective: See treatment diary below    Precautions: None     Daily Treatment Diary      Manual  4/3  4/5  4/12  4/20  5/9             PF Mobz -  AS  5 min  5 min  5'             IASTM -  AS  5 min  10 min  10'                                                                                           Exercise Diary  4/3  4/5  4/12  4/20  5/9             SLR  20x  20x  30x  30x  2#, 4x10             S/L Abduction 20x  20x  20x  30x  2#, 3x12             Clamshells 20x  20x  30x  30x  4x8, 5s hold             Brdiges 20x  30x2''  30x2" with band and Bosu  30x2" with band and bosu  30x2" w/ band             fire hydrants (static)    60''  60"  -  -             static clamshell    60''  30x, or  -  -             static scissor    60''  - "  -              S/L ITB stretch    3x30''  3x30"  4x30"  HEP              R bike    10'  8'  10'  10'              quad Stretch - - 4x30"  4x30"  HEP              Seated t-band ER         3x8, 3s hold                                                                                                                                                                                                             Mendoza Tape LY @ Start  NP  @ end @ end  @ end                   Modalities  4/3  4/5  4/12  5/9               HV with CP 15 min  15'  -                                                                         Assessment: Pt able to progress in open chain exercises challenging thigh and hip musculature  Pt required minimal cueing to facilitate proper technique during hip abduction exercise  Pt completed exercise with moderate fatigue and no increase in resting pain  Pt was educated on completion of stretches to complete for HEP  Pt would benefit from continued PT to increase knee strength and stability, reduce pain, and increase level of function  Plan: Continue per plan of care  Pt  Was treated by RENEE Caruso under the supervision of Carvin Baumgarten, PT, DPT

## 2018-05-14 ENCOUNTER — OFFICE VISIT (OUTPATIENT)
Dept: PHYSICAL THERAPY | Facility: CLINIC | Age: 23
End: 2018-05-14
Payer: COMMERCIAL

## 2018-05-14 DIAGNOSIS — M22.2X2 PATELLOFEMORAL PAIN SYNDROME OF BOTH KNEES: Primary | ICD-10-CM

## 2018-05-14 DIAGNOSIS — M22.2X1 PATELLOFEMORAL PAIN SYNDROME OF BOTH KNEES: Primary | ICD-10-CM

## 2018-05-14 PROCEDURE — 97110 THERAPEUTIC EXERCISES: CPT | Performed by: PHYSICAL THERAPIST

## 2018-05-14 PROCEDURE — 97140 MANUAL THERAPY 1/> REGIONS: CPT | Performed by: PHYSICAL THERAPIST

## 2018-05-14 NOTE — PROGRESS NOTES
Daily Note     Today's date: 2018  Patient name: [place]  : 1995  MRN: [id number]  Referring provider: Lilyan Cranker, PA-C  Dx:   Encounter Diagnosis     ICD-10-CM    1  Patellofemoral pain syndrome of both knees M22 2X1     M22 2X2                   Subjective: Pt states that her knees have been bothering her after sitting for long periods of time during car rides over the weekend         Objective: See treatment diary below    Precautions: None     Daily Treatment Diary      Manual  4/3  4/5  4/12  4/20  5/9  5/14           PF Mobz -  AS  5 min  5 min  5'  5'           IASTM -  AS  5 min  10 min  10'  10'                                                                                         Exercise Diary  4/3  4/5  4/12  4/20  5/9  5/14           SLR  20x  20x  30x  30x  2#, 4x10  2#, 4x10           S/L Abduction 20x  20x  20x  30x  2#, 3x12  2#, 3x12           Clamshells 20x  20x  30x  30x  4x8, 5s hold  4x8x5"           Brdiges 20x  30x2''  30x2" with band and Bosu  30x2" with band and bosu  30x2" w/ band  30x2" w/ band           fire hydrants (static)    60''  60"  -  -             static clamshell    60''  30x, or  -  -             static scissor    60''  - "  -              S/L ITB stretch    3x30''  3x30"  4x30"  HEP  HEP            R bike    5'  8'  10'  10'  10'            quad Stretch - - 4x30"  4x30"  HEP  HEP            Seated t-band ER         3x8, 3s hold  3x8x3", red            S/L figure 8            2x30"                                                                                                                                                                                   Mendoza Tape LY @ Start  NP  @ end @ end  @ end  -                  Modalities  4/3  4/5  4/12  5/9  5/14             HV with CP 15 min  15'  -  -  -                                                                     Assessment: Patellar mobility restrictions present inferiorly and laterally, noted during mobilizations  Pt completed open chain exercises with on reports of pain  Pt should continue to challenged in open chain hip and knee strengthening according to pt tolerance  Pt would benefit from continued PT to reduce pain and increase level of function  Plan: Continue per plan of care

## 2018-05-21 ENCOUNTER — OFFICE VISIT (OUTPATIENT)
Dept: PHYSICAL THERAPY | Facility: CLINIC | Age: 23
End: 2018-05-21
Payer: COMMERCIAL

## 2018-05-21 DIAGNOSIS — M22.2X2 PATELLOFEMORAL PAIN SYNDROME OF BOTH KNEES: Primary | ICD-10-CM

## 2018-05-21 DIAGNOSIS — M22.2X1 PATELLOFEMORAL PAIN SYNDROME OF BOTH KNEES: Primary | ICD-10-CM

## 2018-05-21 PROCEDURE — 97110 THERAPEUTIC EXERCISES: CPT | Performed by: PHYSICAL THERAPIST

## 2018-05-21 NOTE — PROGRESS NOTES
Daily Note     Today's date: 2018  Patient name: [place]  : 1995  MRN: [id number]  Referring provider: Jeffery De La Cruz PA-C  Dx:   Encounter Diagnosis     ICD-10-CM    1  Patellofemoral pain syndrome of both knees M22 2X1     M22 2X2                   Subjective: Pt states that her knees hurt and have still been bothering her         Objective: See treatment diary below    Precautions: None     Daily Treatment Diary      Manual  4/3  4/5  4/12  4/20  5/9  5/14  5/21         PF Mobz -  AS  5 min  5 min  5'  5'  -         IASTM -  AS  5 min  10 min  10'  10'  -                                                                                       Exercise Diary  4/3  4/5  4/12  4/20  5/9  5/14  5/21         SLR  20x  20x  30x  30x  2#, 4x10  2#, 4x10  2 5#, 4x10         S/L Abduction 20x  20x  20x  30x  2#, 3x12  2#, 3x12  2 5#, 4x10         Clamshells 20x  20x  30x  30x  4x8, 5s hold  4x8x5"  4x8x5"         Bridges 20x  30x2''  30x2" with band and Bosu  30x2" with band and bosu  30x2" w/ band  30x2" w/ band  -         fire hydrants (static)    60''  60"  -  -    -         static clamshell    60''  30x, or  -  -    -         static scissor    60''  - "  -    -          S/L ITB stretch    3x30''  3x30"  4x30"  HEP  HEP  -          R bike    5'  8'  10'  10'  10'  10'          quad Stretch - - 4x30"  4x30"  HEP  HEP  -          Seated t-band ER         3x8, 3s hold  3x8x3", red  -          S/L figure 8            2x30"  -          Ball squat             25#x3'                                                                                                                                                         Mendoza Tape LY @ Start  NP  @ end @ end  @ end  -                  Modalities  4/3  4/5  4/12  5/9  5/14  5/21           HV with CP 15 min  15'  -  -  -  -                                                                   Assessment: Pt was able to progress to closed chain strengthening exercises for lower extremity  Pt reported pain limited ROM during exercise, but pt was able to increase ROM as exercise duration progressed  Closed and open chain exercises to be progressed according to patient tolerance  Pt would benefit from continued PT to reduce pain and increase level of function  Plan: Continue per plan of care

## 2018-06-07 NOTE — PROGRESS NOTES
Discharge Note    Today's date: 2018  Patient name: [place]  : 1995  MRN: [id number]  Referring provider: Anaya Morrissey PA-C  Dx:   Encounter Diagnosis     ICD-10-CM    1  Patellofemoral pain syndrome of both knees M22 2X1     M22 2X2        Start Time: 1030  Stop Time: 1130  Total time in clinic (min): 60 minutes    Subjective: Pt  Has not returned to PT or scheduled new appointments  Assessment:   [place] has continued with PT 2-3x/week, progressing as tolerated with treatment for   1  Patellofemoral pain syndrome of both knees       PT will be discontinued at this time due to non compliance  Pt  Was left message to f/u prn         Plan: DC at this time

## 2019-02-22 ENCOUNTER — OFFICE VISIT (OUTPATIENT)
Dept: URGENT CARE | Facility: CLINIC | Age: 24
End: 2019-02-22
Payer: COMMERCIAL

## 2019-02-22 VITALS
RESPIRATION RATE: 16 BRPM | WEIGHT: 143 LBS | BODY MASS INDEX: 21.18 KG/M2 | OXYGEN SATURATION: 99 % | TEMPERATURE: 97.2 F | HEART RATE: 156 BPM | SYSTOLIC BLOOD PRESSURE: 133 MMHG | DIASTOLIC BLOOD PRESSURE: 90 MMHG | HEIGHT: 69 IN

## 2019-02-22 DIAGNOSIS — J06.9 VIRAL UPPER RESPIRATORY TRACT INFECTION: Primary | ICD-10-CM

## 2019-02-22 LAB — S PYO AG THROAT QL: NEGATIVE

## 2019-02-22 PROCEDURE — S9088 SERVICES PROVIDED IN URGENT: HCPCS | Performed by: EMERGENCY MEDICINE

## 2019-02-22 PROCEDURE — 99213 OFFICE O/P EST LOW 20 MIN: CPT | Performed by: EMERGENCY MEDICINE

## 2019-02-22 RX ORDER — PREDNISONE 10 MG/1
TABLET ORAL
Qty: 27 TABLET | Refills: 0 | Status: SHIPPED | OUTPATIENT
Start: 2019-02-22 | End: 2019-12-27

## 2019-02-22 NOTE — PATIENT INSTRUCTIONS
You have been diagnosed with a Viral Upper Respiratory infection and your symptoms should resolve over the next 7 to 10 days with the treatments recommended today   If they do not, it is possible that you have developed a bacterial infection and you should return  If you were to take an antibiotic while you are still in the viral stage, you will not get better any faster, but could kill off the good germs in your body as well as make the germs in you resistant to the antibiotic  Take an expectorant - guaifenesin should be the only ingredient - during the day, and the cough suppressant (ex  Robitussin DM or Tessalon) if needed at night only  Take Zinc 12 5 to 15 mg every 2 - 3 hrs while awake for the next few days   You may take Cold Reinaldo (13 3 mg of Zinc) or split a 25 mg Zinc tablet or lozenge in two or a 50 mg into four to get the proper dose   The total daily dose of Zinc should exceed 75 mg per day   You may also take a decongestant like Sudafed, unless you have hypertension or cardiac disease  Hold any NSAID's like Ibuprofen (Advil), Naprosyn (Aleve), etc while on steroids like Medrol or Prednisone  If you are diabetic, you should also adhere strictly to your diet and monitor your blood sugar closely while on the steroids as discussed  Upper Respiratory Infection   AMBULATORY CARE:   An upper respiratory infection  is also called a common cold  It can affect your nose, throat, ears, and sinuses  Common signs and symptoms include the following:  Cold symptoms are usually worst for the first 3 to 5 days  You may have any of the following:  · Runny or stuffy nose    · Sneezing and coughing    · Sore throat or hoarseness    · Red, watery, and sore eyes    · Fatigue     · Chills and fever    · Headache, body aches, or sore muscles  Seek care immediately if:   · You have chest pain or trouble breathing  Contact your healthcare provider if:   · You have a fever over 102ºF (39°C)      · Your sore throat gets worse or you see white or yellow spots in your throat  · Your symptoms get worse after 3 to 5 days or your cold is not better in 14 days  · You have a rash anywhere on your skin  · You have large, tender lumps in your neck  · You have thick, green or yellow drainage from your nose  · You cough up thick yellow, green, or bloody mucus  · You have vomiting for more than 24 hours and cannot keep fluids down  · You have a bad earache  · You have questions or concerns about your condition or care  Treatment for a cold: There is no cure for the common cold  Colds are caused by viruses and do not get better with antibiotics  Most people get better in 7 to 14 days  You may continue to cough for 2 to 3 weeks  The following may help decrease your symptoms:  · Decongestants  help reduce nasal congestion and help you breathe more easily  If you take decongestant pills, they may make you feel restless or not able to sleep  Do not use decongestant sprays for more than a few days  · Cough suppressants  help reduce coughing  Ask your healthcare provider which type of cough medicine is best for you  · NSAIDs , such as ibuprofen, help decrease swelling, pain, and fever  NSAIDs can cause stomach bleeding or kidney problems in certain people  If you take blood thinner medicine, always ask your healthcare provider if NSAIDs are safe for you  Always read the medicine label and follow directions  · Acetaminophen  decreases pain and fever  It is available without a doctor's order  Ask how much to take and how often to take it  Follow directions  Read the labels of all other medicines you are using to see if they also contain acetaminophen, or ask your doctor or pharmacist  Acetaminophen can cause liver damage if not taken correctly  Do not use more than 4 grams (4,000 milligrams) total of acetaminophen in one day  Manage your cold:   · Rest as much as possible  Slowly start to do more each day  · Drink more liquids as directed  Liquids will help thin and loosen mucus so you can cough it up  Liquids will also help prevent dehydration  Liquids that help prevent dehydration include water, fruit juice, and broth  Do not drink liquids that contain caffeine  Caffeine can increase your risk for dehydration  Ask your healthcare provider how much liquid to drink each day  · Soothe a sore throat  Gargle with warm salt water  This helps your sore throat feel better  Make salt water by dissolving ¼ teaspoon salt in 1 cup warm water  You may also suck on hard candy or throat lozenges  You may use a sore throat spray  · Use a humidifier or vaporizer  Use a cool mist humidifier or a vaporizer to increase air moisture in your home  This may make it easier for you to breathe and help decrease your cough  · Use saline nasal drops as directed  These help relieve congestion  · Apply petroleum-based jelly around the outside of your nostrils  This can decrease irritation from blowing your nose  · Do not smoke  Nicotine and other chemicals in cigarettes and cigars can make your symptoms worse  They can also cause infections such as bronchitis or pneumonia  Ask your healthcare provider for information if you currently smoke and need help to quit  E-cigarettes or smokeless tobacco still contain nicotine  Talk to your healthcare provider before you use these products  Prevent spreading your cold to others:   · Try to stay away from other people during the first 2 to 3 days of your cold when it is more easily spread  · Do not share food or drinks  · Do not share hand towels with household members  · Wash your hands often, especially after you blow your nose  Turn away from other people and cover your mouth and nose with a tissue when you sneeze or cough  Follow up with your healthcare provider as directed:  Write down your questions so you remember to ask them during your visits     © 2017 [employer] [facility] Information is for End User's use only and may not be sold, redistributed or otherwise used for commercial purposes  All illustrations and images included in CareNotes® are the copyrighted property of [employer]  or Anoop [employer]  The above information is an  only  It is not intended as medical advice for individual conditions or treatments  Talk to your doctor, nurse or pharmacist before following any medical regimen to see if it is safe and effective for you

## 2019-02-22 NOTE — PROGRESS NOTES
[facility]        NAME: Carlos Maunel is a 20 y o  female  : 1995    MRN: [id number]  DATE: 2019  TIME: 2:03 PM    Assessment and Plan   Viral upper respiratory tract infection [J06 9]  1  Viral upper respiratory tract infection           Patient Instructions     Patient Instructions     You have been diagnosed with a Viral Upper Respiratory infection and your symptoms should resolve over the next 7 to 10 days with the treatments recommended today   If they do not, it is possible that you have developed a bacterial infection and you should return  If you were to take an antibiotic while you are still in the viral stage, you will not get better any faster, but could kill off the good germs in your body as well as make the germs in you resistant to the antibiotic  Take an expectorant - guaifenesin should be the only ingredient - during the day, and the cough suppressant (ex  Robitussin DM or Tessalon) if needed at night only  Take Zinc 12 5 to 15 mg every 2 - 3 hrs while awake for the next few days   You may take Cold Reinaldo (13 3 mg of Zinc) or split a 25 mg Zinc tablet or lozenge in two or a 50 mg into four to get the proper dose   The total daily dose of Zinc should exceed 75 mg per day   You may also take a decongestant like Sudafed, unless you have hypertension or cardiac disease  Hold any NSAID's like Ibuprofen (Advil), Naprosyn (Aleve), etc while on steroids like Medrol or Prednisone  If you are diabetic, you should also adhere strictly to your diet and monitor your blood sugar closely while on the steroids as discussed  Upper Respiratory Infection   AMBULATORY CARE:   An upper respiratory infection  is also called a common cold  It can affect your nose, throat, ears, and sinuses  Common signs and symptoms include the following:  Cold symptoms are usually worst for the first 3 to 5 days   You may have any of the following:  · Runny or stuffy nose    · Sneezing and coughing    · Sore throat or hoarseness    · Red, watery, and sore eyes    · Fatigue     · Chills and fever    · Headache, body aches, or sore muscles  Seek care immediately if:   · You have chest pain or trouble breathing  Contact your healthcare provider if:   · You have a fever over 102ºF (39°C)  · Your sore throat gets worse or you see white or yellow spots in your throat  · Your symptoms get worse after 3 to 5 days or your cold is not better in 14 days  · You have a rash anywhere on your skin  · You have large, tender lumps in your neck  · You have thick, green or yellow drainage from your nose  · You cough up thick yellow, green, or bloody mucus  · You have vomiting for more than 24 hours and cannot keep fluids down  · You have a bad earache  · You have questions or concerns about your condition or care  Treatment for a cold: There is no cure for the common cold  Colds are caused by viruses and do not get better with antibiotics  Most people get better in 7 to 14 days  You may continue to cough for 2 to 3 weeks  The following may help decrease your symptoms:  · Decongestants  help reduce nasal congestion and help you breathe more easily  If you take decongestant pills, they may make you feel restless or not able to sleep  Do not use decongestant sprays for more than a few days  · Cough suppressants  help reduce coughing  Ask your healthcare provider which type of cough medicine is best for you  · NSAIDs , such as ibuprofen, help decrease swelling, pain, and fever  NSAIDs can cause stomach bleeding or kidney problems in certain people  If you take blood thinner medicine, always ask your healthcare provider if NSAIDs are safe for you  Always read the medicine label and follow directions  · Acetaminophen  decreases pain and fever  It is available without a doctor's order  Ask how much to take and how often to take it  Follow directions   Read the labels of all other medicines you are using to see if they also contain acetaminophen, or ask your doctor or pharmacist  Acetaminophen can cause liver damage if not taken correctly  Do not use more than 4 grams (4,000 milligrams) total of acetaminophen in one day  Manage your cold:   · Rest as much as possible  Slowly start to do more each day  · Drink more liquids as directed  Liquids will help thin and loosen mucus so you can cough it up  Liquids will also help prevent dehydration  Liquids that help prevent dehydration include water, fruit juice, and broth  Do not drink liquids that contain caffeine  Caffeine can increase your risk for dehydration  Ask your healthcare provider how much liquid to drink each day  · Soothe a sore throat  Gargle with warm salt water  This helps your sore throat feel better  Make salt water by dissolving ¼ teaspoon salt in 1 cup warm water  You may also suck on hard candy or throat lozenges  You may use a sore throat spray  · Use a humidifier or vaporizer  Use a cool mist humidifier or a vaporizer to increase air moisture in your home  This may make it easier for you to breathe and help decrease your cough  · Use saline nasal drops as directed  These help relieve congestion  · Apply petroleum-based jelly around the outside of your nostrils  This can decrease irritation from blowing your nose  · Do not smoke  Nicotine and other chemicals in cigarettes and cigars can make your symptoms worse  They can also cause infections such as bronchitis or pneumonia  Ask your healthcare provider for information if you currently smoke and need help to quit  E-cigarettes or smokeless tobacco still contain nicotine  Talk to your healthcare provider before you use these products  Prevent spreading your cold to others:   · Try to stay away from other people during the first 2 to 3 days of your cold when it is more easily spread  · Do not share food or drinks       · Do not share hand towels with household members  · Wash your hands often, especially after you blow your nose  Turn away from other people and cover your mouth and nose with a tissue when you sneeze or cough  Follow up with your healthcare provider as directed:  Write down your questions so you remember to ask them during your visits  © 2017 [employer] Information is for End User's use only and may not be sold, redistributed or otherwise used for commercial purposes  All illustrations and images included in CareNotes® are the copyrighted property of [employer]  or [employer]  The above information is an  only  It is not intended as medical advice for individual conditions or treatments  Talk to your doctor, nurse or pharmacist before following any medical regimen to see if it is safe and effective for you  Follow up with PCP in 3-5 days  Proceed to  ER if symptoms worsen  Chief Complaint     Chief Complaint   Patient presents with    Sore Throat     Onset about 7 days ago         History of Present Illness       Patient complains of sore throat, cough and congestion for the past 7 days  Review of Systems   Review of Systems   Constitutional: Negative for chills and fever  HENT: Positive for congestion, rhinorrhea, sinus pressure and sore throat  Negative for trouble swallowing and voice change  Respiratory: Positive for cough  Negative for chest tightness, shortness of breath and wheezing  Cardiovascular: Negative for chest pain           Current Medications       Current Outpatient Medications:     Acetaminophen 500 MG, Take 500 mg by mouth, Disp: , Rfl:     albuterol (2 5 mg/3 mL) 0 083 % nebulizer solution, Inhale 2 5 mg, Disp: , Rfl:     albuterol (PROVENTIL HFA,VENTOLIN HFA) 90 mcg/act inhaler, Inhale 2 puffs, Disp: , Rfl:     amphetamine-dextroamphetamine (ADDERALL XR) 20 MG 24 hr capsule, Take 20 mg by mouth every morning, Disp: , Rfl:    cyclobenzaprine (FLEXERIL) 10 mg tablet, Take 10 mg by mouth 3 (three) times a day as needed for muscle spasms, Disp: , Rfl:     folic acid (FOLVITE) 1 mg tablet, Take 1 mg by mouth, Disp: , Rfl:     lamoTRIgine (LAMICTAL) 100 mg tablet, Take by mouth, Disp: , Rfl:     lamoTRIgine (LaMICtal) 150 MG tablet, Take 150 mg by mouth daily, Disp: , Rfl: 2    Levomilnacipran HCl ER (FETZIMA) 40 MG CP24, Take by mouth, Disp: , Rfl:     traMADol (ULTRAM) 50 mg tablet, Take 50 mg by mouth, Disp: , Rfl:     tretinoin (REFISSA) 0 05 % cream, Apply topically, Disp: , Rfl:     amphetamine-dextroamphetamine (ADDERALL, 20MG,) 20 mg tablet, Take by mouth, Disp: , Rfl:     FETZIMA 40 MG CP24, , Disp: , Rfl:     Current Allergies     Allergies as of 2019    (No Known Allergies)            The following portions of the patient's history were reviewed and updated as appropriate: allergies, current medications, past family history, past medical history, past social history, past surgical history and problem list      Past Medical History:   Diagnosis Date    Anxiety        Past Surgical History:   Procedure Laterality Date     SECTION         History reviewed  No pertinent family history  Medications have been verified  Objective   /90   Pulse (!) 156   Temp (!) 97 2 °F (36 2 °C) (Tympanic)   Resp 16   Ht 5' 9" (1 753 m)   Wt 64 9 kg (143 lb)   LMP 2019 (Approximate)   SpO2 99%   BMI 21 12 kg/m²        Physical Exam     Physical Exam   Constitutional: She is oriented to person, place, and time  She appears well-developed and well-nourished  No distress  HENT:   Head: Normocephalic and atraumatic  Right Ear: Tympanic membrane and external ear normal    Left Ear: Tympanic membrane and external ear normal    Nose: Mucosal edema present  Mouth/Throat: Posterior oropharyngeal erythema present  No oropharyngeal exudate or tonsillar abscesses  Neck: Neck supple     Cardiovascular: Normal rate and regular rhythm  Pulmonary/Chest: Effort normal    Abdominal: Soft  Bowel sounds are normal    Neurological: She is alert and oriented to person, place, and time  Skin: Skin is warm and dry  Nursing note and vitals reviewed

## 2019-12-27 ENCOUNTER — APPOINTMENT (EMERGENCY)
Dept: CT IMAGING | Facility: HOSPITAL | Age: 24
End: 2019-12-27
Payer: COMMERCIAL

## 2019-12-27 ENCOUNTER — HOSPITAL ENCOUNTER (EMERGENCY)
Facility: HOSPITAL | Age: 24
Discharge: HOME | End: 2019-12-27
Attending: EMERGENCY MEDICINE | Admitting: EMERGENCY MEDICINE
Payer: COMMERCIAL

## 2019-12-27 ENCOUNTER — OFFICE VISIT (OUTPATIENT)
Dept: URGENT CARE | Facility: CLINIC | Age: 24
End: 2019-12-27
Payer: COMMERCIAL

## 2019-12-27 VITALS
SYSTOLIC BLOOD PRESSURE: 98 MMHG | HEIGHT: 69 IN | RESPIRATION RATE: 18 BRPM | BODY MASS INDEX: 22.22 KG/M2 | WEIGHT: 150 LBS | OXYGEN SATURATION: 99 % | TEMPERATURE: 97.1 F | HEART RATE: 124 BPM | DIASTOLIC BLOOD PRESSURE: 52 MMHG

## 2019-12-27 VITALS
BODY MASS INDEX: 21.39 KG/M2 | DIASTOLIC BLOOD PRESSURE: 66 MMHG | HEART RATE: 110 BPM | RESPIRATION RATE: 18 BRPM | WEIGHT: 144.84 LBS | TEMPERATURE: 98.4 F | SYSTOLIC BLOOD PRESSURE: 102 MMHG | OXYGEN SATURATION: 99 %

## 2019-12-27 DIAGNOSIS — R07.89 OTHER CHEST PAIN: Primary | ICD-10-CM

## 2019-12-27 DIAGNOSIS — R00.0 TACHYCARDIA: ICD-10-CM

## 2019-12-27 DIAGNOSIS — R07.9 CHEST PAIN: Primary | ICD-10-CM

## 2019-12-27 LAB
ANION GAP SERPL CALCULATED.3IONS-SCNC: 11 MMOL/L (ref 4–13)
ATRIAL RATE: 106 BPM
BASOPHILS # BLD AUTO: 0.03 THOUSANDS/ΜL (ref 0–0.1)
BASOPHILS NFR BLD AUTO: 1 % (ref 0–1)
BUN SERPL-MCNC: 7 MG/DL (ref 5–25)
CALCIUM SERPL-MCNC: 9.1 MG/DL (ref 8.3–10.1)
CHLORIDE SERPL-SCNC: 99 MMOL/L (ref 100–108)
CO2 SERPL-SCNC: 26 MMOL/L (ref 21–32)
CREAT SERPL-MCNC: 0.84 MG/DL (ref 0.6–1.3)
EOSINOPHIL # BLD AUTO: 0.38 THOUSAND/ΜL (ref 0–0.61)
EOSINOPHIL NFR BLD AUTO: 6 % (ref 0–6)
ERYTHROCYTE [DISTWIDTH] IN BLOOD BY AUTOMATED COUNT: 12 % (ref 11.6–15.1)
EXT PREG TEST URINE: NEGATIVE
EXT. CONTROL ED NAV: NORMAL
GFR SERPL CREATININE-BSD FRML MDRD: 96 ML/MIN/1.73SQ M
GLUCOSE SERPL-MCNC: 103 MG/DL (ref 65–140)
HCT VFR BLD AUTO: 41.8 % (ref 34.8–46.1)
HGB BLD-MCNC: 14.8 G/DL (ref 11.5–15.4)
IMM GRANULOCYTES # BLD AUTO: 0.01 THOUSAND/UL (ref 0–0.2)
IMM GRANULOCYTES NFR BLD AUTO: 0 % (ref 0–2)
LYMPHOCYTES # BLD AUTO: 2.02 THOUSANDS/ΜL (ref 0.6–4.47)
LYMPHOCYTES NFR BLD AUTO: 33 % (ref 14–44)
MCH RBC QN AUTO: 33.3 PG (ref 26.8–34.3)
MCHC RBC AUTO-ENTMCNC: 35.4 G/DL (ref 31.4–37.4)
MCV RBC AUTO: 94 FL (ref 82–98)
MONOCYTES # BLD AUTO: 0.23 THOUSAND/ΜL (ref 0.17–1.22)
MONOCYTES NFR BLD AUTO: 4 % (ref 4–12)
NEUTROPHILS # BLD AUTO: 3.45 THOUSANDS/ΜL (ref 1.85–7.62)
NEUTS SEG NFR BLD AUTO: 56 % (ref 43–75)
NRBC BLD AUTO-RTO: 0 /100 WBCS
P AXIS: 54 DEGREES
PLATELET # BLD AUTO: 268 THOUSANDS/UL (ref 149–390)
PMV BLD AUTO: 9.2 FL (ref 8.9–12.7)
POTASSIUM SERPL-SCNC: 3.9 MMOL/L (ref 3.5–5.3)
PR INTERVAL: 130 MS
QRS AXIS: 82 DEGREES
QRSD INTERVAL: 78 MS
QT INTERVAL: 328 MS
QTC INTERVAL: 435 MS
RBC # BLD AUTO: 4.45 MILLION/UL (ref 3.81–5.12)
SODIUM SERPL-SCNC: 136 MMOL/L (ref 136–145)
T WAVE AXIS: 53 DEGREES
VENTRICULAR RATE: 106 BPM
WBC # BLD AUTO: 6.12 THOUSAND/UL (ref 4.31–10.16)

## 2019-12-27 PROCEDURE — S9088 SERVICES PROVIDED IN URGENT: HCPCS | Performed by: EMERGENCY MEDICINE

## 2019-12-27 PROCEDURE — 80048 BASIC METABOLIC PNL TOTAL CA: CPT | Performed by: EMERGENCY MEDICINE

## 2019-12-27 PROCEDURE — 99284 EMERGENCY DEPT VISIT MOD MDM: CPT | Performed by: EMERGENCY MEDICINE

## 2019-12-27 PROCEDURE — 36415 COLL VENOUS BLD VENIPUNCTURE: CPT | Performed by: EMERGENCY MEDICINE

## 2019-12-27 PROCEDURE — 99285 EMERGENCY DEPT VISIT HI MDM: CPT

## 2019-12-27 PROCEDURE — 99213 OFFICE O/P EST LOW 20 MIN: CPT | Performed by: EMERGENCY MEDICINE

## 2019-12-27 PROCEDURE — 96360 HYDRATION IV INFUSION INIT: CPT

## 2019-12-27 PROCEDURE — 71275 CT ANGIOGRAPHY CHEST: CPT

## 2019-12-27 PROCEDURE — 93010 ELECTROCARDIOGRAM REPORT: CPT | Performed by: INTERNAL MEDICINE

## 2019-12-27 PROCEDURE — 81025 URINE PREGNANCY TEST: CPT | Performed by: EMERGENCY MEDICINE

## 2019-12-27 PROCEDURE — 93005 ELECTROCARDIOGRAM TRACING: CPT

## 2019-12-27 PROCEDURE — 85025 COMPLETE CBC W/AUTO DIFF WBC: CPT | Performed by: EMERGENCY MEDICINE

## 2019-12-27 RX ADMIN — IOHEXOL 85 ML: 350 INJECTION, SOLUTION INTRAVENOUS at 16:36

## 2019-12-27 RX ADMIN — SODIUM CHLORIDE 1000 ML: 0.9 INJECTION, SOLUTION INTRAVENOUS at 15:33

## 2019-12-27 NOTE — ED PROVIDER NOTES
History  Chief Complaint   Patient presents with    Chest Pain     Patient has sharp chest pain starting last night at ,pain began while patient was relaxing at home,  denies N/V/SOB     Intermittent chest pains recurred last night   Sent from urgent care for evaluation for PE secondary to tachycardia  Patient notes tachycardia ongoing for the past few years, so Cardiology and instructed to discontinue smoking, using caffeine and begin exercising-but did none of these things and heart rate ranges from 120-160  Denies hemoptysis  No history of venous thromboembolism      Chest Pain   Pain location:  L chest  Pain quality: sharp    Pain radiates to:  Does not radiate  Pain radiates to the back: no    Pain severity:  Mild  Onset quality:  Sudden  Timing:  Intermittent  Progression:  Unchanged  Chronicity:  New  Context: breathing    Associated symptoms: no abdominal pain, no fever and no shortness of breath        Prior to Admission Medications   Prescriptions Last Dose Informant Patient Reported? Taking?    Acetaminophen 500 MG   Yes No   Sig: Take 500 mg by mouth   FETZIMA 40 MG CP24   Yes No   Levomilnacipran HCl ER (FETZIMA) 40 MG CP24   Yes No   Sig: Take by mouth   albuterol (2 5 mg/3 mL) 0 083 % nebulizer solution   Yes No   Sig: Inhale 2 5 mg   albuterol (PROVENTIL HFA,VENTOLIN HFA) 90 mcg/act inhaler   Yes No   Sig: Inhale 2 puffs   cyclobenzaprine (FLEXERIL) 10 mg tablet   Yes No   Sig: Take 10 mg by mouth 3 (three) times a day as needed for muscle spasms   folic acid (FOLVITE) 1 mg tablet   Yes No   Sig: Take 1 mg by mouth   lamoTRIgine (LaMICtal) 150 MG tablet   Yes No   Sig: Take 150 mg by mouth daily   traMADol (ULTRAM) 50 mg tablet   Yes No   Sig: Take 50 mg by mouth      Facility-Administered Medications: None       Past Medical History:   Diagnosis Date    Anxiety     Depression     Narcolepsy     Tachycardia        Past Surgical History:   Procedure Laterality Date     SECTION      NOSE SURGERY         Family History   Problem Relation Age of Onset    No Known Problems Mother     No Known Problems Father      I have reviewed and agree with the history as documented  Social History     Tobacco Use    Smoking status: Current Every Day Smoker     Packs/day: 0 50     Years: 8 00     Pack years: 4 00    Smokeless tobacco: Never Used   Substance Use Topics    Alcohol use: Yes     Frequency: Monthly or less    Drug use: Never        Review of Systems   Constitutional: Negative for fever  Respiratory: Negative for shortness of breath  Cardiovascular: Positive for chest pain  Gastrointestinal: Negative for abdominal pain  All other systems reviewed and are negative  Physical Exam  Physical Exam   Constitutional: She is oriented to person, place, and time  She appears well-developed and well-nourished  HENT:   Head: Normocephalic and atraumatic  Mouth/Throat: Oropharynx is clear and moist    Eyes: Pupils are equal, round, and reactive to light  Conjunctivae and EOM are normal    Neck: Neck supple  Cardiovascular: Regular rhythm, normal heart sounds and intact distal pulses  Tachycardia present  Left anterior inferior chest wall without deformity or overlying skin changes   Pulmonary/Chest: Effort normal and breath sounds normal    Abdominal: Soft  Bowel sounds are normal  She exhibits no distension  There is no tenderness  Musculoskeletal: Normal range of motion  Neurological: She is alert and oriented to person, place, and time  No cranial nerve deficit  Coordination normal    Skin: Skin is warm and dry  Psychiatric: She has a normal mood and affect  Her behavior is normal  Judgment and thought content normal    Nursing note and vitals reviewed        Vital Signs  ED Triage Vitals [12/27/19 1513]   Temperature Pulse Respirations Blood Pressure SpO2   98 4 °F (36 9 °C) (!) 114 21 125/87 97 %      Temp Source Heart Rate Source Patient Position - Orthostatic VS BP Location FiO2 (%)   Oral Monitor Sitting Right arm --      Pain Score       --           Vitals:    12/27/19 1513   BP: 125/87   Pulse: (!) 114   Patient Position - Orthostatic VS: Sitting         Visual Acuity      ED Medications  Medications   sodium chloride 0 9 % bolus 1,000 mL (0 mL Intravenous Stopped 12/27/19 1703)   iohexol (OMNIPAQUE) 350 MG/ML injection (SINGLE-DOSE) 100 mL (85 mL Intravenous Given 12/27/19 1636)       Diagnostic Studies  Results Reviewed     Procedure Component Value Units Date/Time    Basic metabolic panel [[id number]]  (Abnormal) Collected:  12/27/19 1532    Lab Status:  Final result Specimen:  Blood from Arm, Left Updated:  12/27/19 1618     Sodium 136 mmol/L      Potassium 3 9 mmol/L      Chloride 99 mmol/L      CO2 26 mmol/L      ANION GAP 11 mmol/L      BUN 7 mg/dL      Creatinine 0 84 mg/dL      Glucose 103 mg/dL      Calcium 9 1 mg/dL      eGFR 96 ml/min/1 73sq m     Narrative:       [place] guidelines for Chronic Kidney Disease (CKD):     Stage 1 with normal or high GFR (GFR > 90 mL/min/1 73 square meters)    Stage 2 Mild CKD (GFR = 60-89 mL/min/1 73 square meters)    Stage 3A Moderate CKD (GFR = 45-59 mL/min/1 73 square meters)    Stage 3B Moderate CKD (GFR = 30-44 mL/min/1 73 square meters)    Stage 4 Severe CKD (GFR = 15-29 mL/min/1 73 square meters)    Stage 5 End Stage CKD (GFR <15 mL/min/1 73 square meters)  Note: GFR calculation is accurate only with a steady state creatinine    CBC and differential [[id number]] Collected:  12/27/19 1532    Lab Status:  Final result Specimen:  Blood from Arm, Left Updated:  12/27/19 1550     WBC 6 12 Thousand/uL      RBC 4 45 Million/uL      Hemoglobin 14 8 g/dL      Hematocrit 41 8 %      MCV 94 fL      MCH 33 3 pg      MCHC 35 4 g/dL      RDW 12 0 %      MPV 9 2 fL      Platelets 290 Thousands/uL      nRBC 0 /100 WBCs      Neutrophils Relative 56 %      Immat GRANS % 0 %      Lymphocytes Relative 33 % Monocytes Relative 4 %      Eosinophils Relative 6 %      Basophils Relative 1 %      Neutrophils Absolute 3 45 Thousands/µL      Immature Grans Absolute 0 01 Thousand/uL      Lymphocytes Absolute 2 02 Thousands/µL      Monocytes Absolute 0 23 Thousand/µL      Eosinophils Absolute 0 38 Thousand/µL      Basophils Absolute 0 03 Thousands/µL     POCT pregnancy, urine [[id number]]  (Normal) Resulted:  12/27/19 1544    Lab Status:  Final result Updated:  12/27/19 1544     EXT PREG TEST UR (Ref: Negative) NEGATIVE     Control VALID                 CTA ED chest PE study   Final Result by Lolita Barrios MD (12/27 1649)      No pulmonary embolus  No acute pulmonary disease  Workstation performed: [id number]                    Procedures  ECG 12 Lead Documentation Only  Date/Time: 12/27/2019 3:46 PM  Performed by: Kingsley Hicks DO  Authorized by: Kingsley Hicks DO     Comments:      3:41 p m   normal axis normal intervals no ST elevation or depression             ED Course  ED Course as of Dec 27 1741   Fri Dec 27, 2019   1738 Feels well, comfortable  Discussed resulted agreeable with discharge, will return immediately for worsening symptoms, follow up with her physician within a brief period                                  MDM      Disposition  Final diagnoses:   Chest pain   Tachycardia     Time reflects when diagnosis was documented in both MDM as applicable and the Disposition within this note     Time User Action Codes Description Comment    12/27/2019  5:39 PM Stanley Mills Add [R07 9] Chest pain     12/27/2019  5:39 PM Stanley Mills Add [R00 0] Tachycardia       ED Disposition     ED Disposition Condition Date/Time Comment    Discharge Stable Fri Dec 27, 2019  5:39 PM [address] [place] [address] discharge to home/self care              Follow-up Information     Follow up With Specialties Details Why Contact Info    Mikaela Zimmerman MD Internal Medicine Schedule an appointment as soon as possible for a visit in 1 week  [address]  [phone]            Patient's Medications   Discharge Prescriptions    No medications on file     No discharge procedures on file      ED Provider  Electronically Signed by           Marcelo Castano DO  12/27/19 9489

## 2019-12-27 NOTE — PROGRESS NOTES
[address] [facility]        NAME: Beth Cross is a 30 y o  female  : 1995    MRN: [id number]  DATE: 2019  TIME: 2:16 PM    Assessment and Plan   Other chest pain [R07 89]  1  Other chest pain  Ambulatory Referral to Emergency Medicine    Transfer to other facility   2  Tachycardia  Ambulatory Referral to Emergency Medicine    Transfer to other facility     I offered to send patient to the ER by ambulance but she refuses  She wants to have her mother drive her there  Patient Instructions     There are no Patient Instructions on file for this visit  Follow up with PCP in 3-5 days  Proceed to  ER if symptoms worsen  Chief Complaint     Chief Complaint   Patient presents with    Chest Pain     left lower chest pain when taking a deep breath since last night         History of Present Illness       Patient complains of pleuritic left-sided chest pain since last night  Pain began suddenly  She denies associated shortness of breath  She denies history of PE or DVT  She denies any recent calf pain or swelling  She is a smoker and is on estrogens  Review of Systems   Review of Systems   Constitutional: Negative for chills, fatigue and fever  HENT: Negative for congestion  Respiratory: Negative for cough, shortness of breath and wheezing  Cardiovascular: Positive for chest pain and palpitations  Gastrointestinal: Negative for vomiting  Skin: Negative for rash  Neurological: Negative for syncope and headaches           Current Medications       Current Outpatient Medications:     Acetaminophen 500 MG, Take 500 mg by mouth, Disp: , Rfl:     albuterol (2 5 mg/3 mL) 0 083 % nebulizer solution, Inhale 2 5 mg, Disp: , Rfl:     albuterol (PROVENTIL HFA,VENTOLIN HFA) 90 mcg/act inhaler, Inhale 2 puffs, Disp: , Rfl:     cyclobenzaprine (FLEXERIL) 10 mg tablet, Take 10 mg by mouth 3 (three) times a day as needed for muscle spasms, Disp: , Rfl:     FETZIMA 40 MG CP24, , Disp: , Rfl:     folic acid (FOLVITE) 1 mg tablet, Take 1 mg by mouth, Disp: , Rfl:     lamoTRIgine (LaMICtal) 150 MG tablet, Take 150 mg by mouth daily, Disp: , Rfl: 2    traMADol (ULTRAM) 50 mg tablet, Take 50 mg by mouth, Disp: , Rfl:     Levomilnacipran HCl ER (FETZIMA) 40 MG CP24, Take by mouth, Disp: , Rfl:     Current Allergies     Allergies as of 2019    (No Known Allergies)            The following portions of the patient's history were reviewed and updated as appropriate: allergies, current medications, past family history, past medical history, past social history, past surgical history and problem list      Past Medical History:   Diagnosis Date    Anxiety     Depression     Narcolepsy     Tachycardia        Past Surgical History:   Procedure Laterality Date     SECTION      NOSE SURGERY         Family History   Problem Relation Age of Onset    No Known Problems Mother     No Known Problems Father          Medications have been verified  Objective   BP 98/52   Pulse (!) 124   Temp (!) 97 1 °F (36 2 °C) (Tympanic)   Resp 18   Ht 5' 9" (1 753 m)   Wt 68 kg (150 lb)   LMP 2019   SpO2 99%   BMI 22 15 kg/m²        Physical Exam     Physical Exam   Constitutional: She is oriented to person, place, and time  She appears well-developed and well-nourished  HENT:   Head: Normocephalic and atraumatic  Eyes: Pupils are equal, round, and reactive to light  Conjunctivae and EOM are normal    Neck: Normal range of motion  Neck supple  Cardiovascular: Regular rhythm, normal heart sounds and intact distal pulses  Tachycardia present  Exam reveals no gallop and no friction rub  No murmur heard  Pulmonary/Chest: Effort normal and breath sounds normal    Musculoskeletal: She exhibits no tenderness  Neurological: She is alert and oriented to person, place, and time  Skin: Skin is warm and dry  No rash noted  Nursing note and vitals reviewed

## 2019-12-27 NOTE — DISCHARGE INSTRUCTIONS
Return immediately for worsening symptoms  Tylenol 1000 mg every 6 hours as needed  Advil 40 mg every 6 hours as needed

## 2020-03-19 ENCOUNTER — DOCTOR'S OFFICE (OUTPATIENT)
Dept: URBAN - NONMETROPOLITAN AREA CLINIC 2 | Facility: CLINIC | Age: 25
Setting detail: OPHTHALMOLOGY
End: 2020-03-19
Payer: COMMERCIAL

## 2020-03-19 ENCOUNTER — RX ONLY (RX ONLY)
Age: 25
End: 2020-03-19

## 2020-03-19 ENCOUNTER — OPTICAL OFFICE (OUTPATIENT)
Dept: URBAN - NONMETROPOLITAN AREA CLINIC 5 | Facility: CLINIC | Age: 25
Setting detail: OPHTHALMOLOGY
End: 2020-03-19
Payer: COMMERCIAL

## 2020-03-19 DIAGNOSIS — H52.223: ICD-10-CM

## 2020-03-19 DIAGNOSIS — H52.13: ICD-10-CM

## 2020-03-19 PROBLEM — Z01.00 GOOD OCULAR HEALTH OU: Status: ACTIVE | Noted: 2020-03-19

## 2020-03-19 PROCEDURE — V2025 EYEGLASSES DELUX FRAMES: HCPCS | Performed by: OPTOMETRIST

## 2020-03-19 PROCEDURE — V2784 LENS POLYCARB OR EQUAL: HCPCS | Performed by: OPTOMETRIST

## 2020-03-19 PROCEDURE — V2799 MISC VISION ITEM OR SERVICE: HCPCS | Performed by: OPTOMETRIST

## 2020-03-19 PROCEDURE — V2102 SINGL VISN SPHERE 7.12-20.00: HCPCS | Performed by: OPTOMETRIST

## 2020-03-19 PROCEDURE — 92004 COMPRE OPH EXAM NEW PT 1/>: CPT | Performed by: OPTOMETRIST

## 2020-03-19 PROCEDURE — V2020 VISION SVCS FRAMES PURCHASES: HCPCS | Performed by: OPTOMETRIST

## 2020-03-19 ASSESSMENT — REFRACTION_CURRENTRX
OS_SPHERE: -0.25
OS_CYLINDER: -0.50
OD_CYLINDER: -0.50
OD_SPHERE: -0.25
OD_VPRISM_DIRECTION: SV
OS_AXIS: 081
OS_OVR_VA: 20/
OD_AXIS: 095
OS_VPRISM_DIRECTION: SV
OD_OVR_VA: 20/

## 2020-03-19 ASSESSMENT — VISUAL ACUITY
OS_BCVA: 20/25-1
OD_BCVA: 20/25

## 2020-03-19 ASSESSMENT — SPHEQUIV_DERIVED
OD_SPHEQUIV: -0.625
OS_SPHEQUIV: -0.625
OS_SPHEQUIV: -0.625
OD_SPHEQUIV: -0.625

## 2020-03-19 ASSESSMENT — KERATOMETRY
OD_K1POWER_DIOPTERS: 46.50
OD_K2POWER_DIOPTERS: 47.75
OS_AXISANGLE_DEGREES: 027
OS_K2POWER_DIOPTERS: 47.50
OD_AXISANGLE_DEGREES: 061
OS_K1POWER_DIOPTERS: 46.75

## 2020-03-19 ASSESSMENT — REFRACTION_MANIFEST
OD_VA1: 20/20
OD_AXIS: 105
OD_SPHERE: -0.25
OS_AXIS: 070
OS_VA2: 20/20
OD_VA2: 20/20
OU_VA: 20/20
OD_CYLINDER: -0.75
OS_CYLINDER: -0.75
OS_SPHERE: -0.25
OS_VA1: 20/20

## 2020-03-19 ASSESSMENT — AXIALLENGTH_DERIVED
OS_AL: 22.5519
OS_AL: 22.5519
OD_AL: 22.5519
OD_AL: 22.5519

## 2020-03-19 ASSESSMENT — REFRACTION_AUTOREFRACTION
OS_CYLINDER: -0.75
OD_SPHERE: -0.25
OD_CYLINDER: -0.75
OD_AXIS: 105
OS_SPHERE: -0.25
OS_AXIS: 069

## 2020-03-19 ASSESSMENT — CONFRONTATIONAL VISUAL FIELD TEST (CVF)
OD_FINDINGS: FULL
OS_FINDINGS: FULL

## 2021-06-03 ENCOUNTER — APPOINTMENT (OUTPATIENT)
Dept: URGENT CARE | Facility: CLINIC | Age: 26
End: 2021-06-03

## 2021-06-03 PROCEDURE — 90715 TDAP VACCINE 7 YRS/> IM: CPT

## 2021-08-09 ENCOUNTER — APPOINTMENT (OUTPATIENT)
Dept: LAB | Facility: CLINIC | Age: 26
End: 2021-08-09
Payer: COMMERCIAL

## 2021-08-09 DIAGNOSIS — Z02.1 PHYSICAL EXAM, PRE-EMPLOYMENT: ICD-10-CM

## 2021-08-09 PROCEDURE — 86480 TB TEST CELL IMMUN MEASURE: CPT

## 2021-08-09 PROCEDURE — 36415 COLL VENOUS BLD VENIPUNCTURE: CPT

## 2021-08-11 LAB
GAMMA INTERFERON BACKGROUND BLD IA-ACNC: 0.02 IU/ML
M TB IFN-G BLD-IMP: NEGATIVE
M TB IFN-G CD4+ BCKGRND COR BLD-ACNC: 0 IU/ML
M TB IFN-G CD4+ BCKGRND COR BLD-ACNC: 0.01 IU/ML
MITOGEN IGNF BCKGRD COR BLD-ACNC: >10 IU/ML

## 2021-09-01 ENCOUNTER — OFFICE VISIT (OUTPATIENT)
Dept: URGENT CARE | Facility: CLINIC | Age: 26
End: 2021-09-01
Payer: COMMERCIAL

## 2021-09-01 VITALS
BODY MASS INDEX: 21.47 KG/M2 | WEIGHT: 150 LBS | DIASTOLIC BLOOD PRESSURE: 83 MMHG | RESPIRATION RATE: 18 BRPM | HEART RATE: 113 BPM | HEIGHT: 70 IN | SYSTOLIC BLOOD PRESSURE: 139 MMHG | TEMPERATURE: 98.9 F | OXYGEN SATURATION: 99 %

## 2021-09-01 DIAGNOSIS — G89.29 CHRONIC PAIN OF RIGHT KNEE: Primary | ICD-10-CM

## 2021-09-01 DIAGNOSIS — M25.461 EFFUSION OF RIGHT KNEE: ICD-10-CM

## 2021-09-01 DIAGNOSIS — M25.561 CHRONIC PAIN OF RIGHT KNEE: Primary | ICD-10-CM

## 2021-09-01 PROCEDURE — 96372 THER/PROPH/DIAG INJ SC/IM: CPT | Performed by: PHYSICIAN ASSISTANT

## 2021-09-01 PROCEDURE — 99213 OFFICE O/P EST LOW 20 MIN: CPT | Performed by: PHYSICIAN ASSISTANT

## 2021-09-01 RX ORDER — KETOROLAC TROMETHAMINE 30 MG/ML
30 INJECTION, SOLUTION INTRAMUSCULAR; INTRAVENOUS ONCE
Status: COMPLETED | OUTPATIENT
Start: 2021-09-01 | End: 2021-09-01

## 2021-09-01 RX ADMIN — KETOROLAC TROMETHAMINE 30 MG: 30 INJECTION, SOLUTION INTRAMUSCULAR; INTRAVENOUS at 15:19

## 2021-09-01 NOTE — PROGRESS NOTES
[facility]        NAME: Kirill Green is a 27 y o  female  : 1995    MRN: [id number]  DATE: 2021  TIME: 4:28 PM    Assessment and Plan   Chronic pain of right knee [M25 561, G89 29]  1  Chronic pain of right knee  ketorolac (TORADOL) injection 30 mg   2  Effusion of right knee       Effusion vs tendinitis vs chronic knee pain    Patient Instructions   Rest, ice, compression, elevation  Over-the-counter Tylenol ibuprofen for pain  Activities as tolerated  Make follow-up appointment with your orthopedic doctor  Follow up with PCP in 3-5 days  Proceed to  ER if symptoms worsen  Chief Complaint     Chief Complaint   Patient presents with    Knee Pain     Right knee pain since monday feels like knee cap shukri pop         History of Present Illness       Patient is a 28-year-old female with no significant past medical history presents the office complaining of right knee pain for 3 days  The pain is rated 9/10 located to the superior aspect of the patella which is worse with full extension and full flexion  Patient states she feels like her knee cap full popped or slip out of place  Has history of hyper mobile joints and chronic knee pain which has been evaluated multiple times by Orthopedics  She has had various injections but they continued to refuse surgery stating her abnormalities are not significant enough  However, patient states this pain is different  Denies any injury or increase physical activity  Admits to swelling but no ecchymosis, warmth, fevers, or chills  Review of Systems   Review of Systems   Musculoskeletal: Positive for arthralgias and joint swelling  Skin: Negative for color change           Current Medications       Current Outpatient Medications:     Acetaminophen 500 MG, Take 500 mg by mouth, Disp: , Rfl:     albuterol (2 5 mg/3 mL) 0 083 % nebulizer solution, Inhale 2 5 mg, Disp: , Rfl:     albuterol (PROVENTIL HFA,VENTOLIN HFA) 90 mcg/act inhaler, Inhale 2 puffs, Disp: , Rfl:     cyclobenzaprine (FLEXERIL) 10 mg tablet, Take 10 mg by mouth 3 (three) times a day as needed for muscle spasms, Disp: , Rfl:     FETZIMA 40 MG CP24, , Disp: , Rfl:     folic acid (FOLVITE) 1 mg tablet, Take 1 mg by mouth, Disp: , Rfl:     lamoTRIgine (LaMICtal) 150 MG tablet, Take 150 mg by mouth daily, Disp: , Rfl: 2    Levomilnacipran HCl ER (FETZIMA) 40 MG CP24, Take by mouth, Disp: , Rfl:     traMADol (ULTRAM) 50 mg tablet, Take 50 mg by mouth, Disp: , Rfl:   No current facility-administered medications for this visit  Current Allergies     Allergies as of 2021 - Reviewed 2019   Allergen Reaction Noted    Diclofenac Rash 2020    Latex Hives, Itching, and Rash 2019            The following portions of the patient's history were reviewed and updated as appropriate: allergies, current medications, past family history, past medical history, past social history, past surgical history and problem list      Past Medical History:   Diagnosis Date    Anxiety     Depression     Narcolepsy     Tachycardia        Past Surgical History:   Procedure Laterality Date     SECTION      NOSE SURGERY         Family History   Problem Relation Age of Onset    No Known Problems Mother     No Known Problems Father          Medications have been verified  Objective   /83   Pulse (!) 113   Temp 98 9 °F (37 2 °C)   Resp 18   Ht 5' 10" (1 778 m)   Wt 68 kg (150 lb)   LMP 08/10/2021   SpO2 99%   BMI 21 52 kg/m²   Patient's last menstrual period was 08/10/2021  Physical Exam     Physical Exam  Vitals and nursing note reviewed  Constitutional:       Appearance: She is well-developed  HENT:      Head: Normocephalic and atraumatic        Right Ear: External ear normal       Left Ear: External ear normal       Nose: Nose normal    Eyes:      General: Lids are normal       Conjunctiva/sclera: Conjunctivae normal  Musculoskeletal:      Right knee: Swelling, effusion and bony tenderness (patella) present  No erythema or ecchymosis  Decreased range of motion  Tenderness (Quadriceps tendon) present over the medial joint line and MCL  No LCL laxity, MCL laxity, ACL laxity or PCL laxity  Normal alignment, normal meniscus and normal patellar mobility  Instability Tests: Anterior drawer test negative  Posterior drawer test negative  Anterior Lachman test negative  Medial Blane test negative and lateral Blane test negative  Skin:     General: Skin is warm and dry  Capillary Refill: Capillary refill takes less than 2 seconds  Neurological:      Mental Status: She is alert

## 2021-09-14 DIAGNOSIS — M25.361 OTHER INSTABILITY, RIGHT KNEE: ICD-10-CM

## 2021-09-14 DIAGNOSIS — S89.91XA UNSPECIFIED INJURY OF RIGHT LOWER LEG, INITIAL ENCOUNTER: ICD-10-CM

## 2021-09-14 DIAGNOSIS — S83.411D SPRAIN OF MEDIAL COLLATERAL LIGAMENT OF RIGHT KNEE, SUBSEQUENT ENCOUNTER: ICD-10-CM

## 2021-09-27 ENCOUNTER — HOSPITAL ENCOUNTER (OUTPATIENT)
Dept: MRI IMAGING | Facility: HOSPITAL | Age: 26
Discharge: HOME | End: 2021-09-27
Payer: COMMERCIAL

## 2021-09-27 DIAGNOSIS — M25.361 OTHER INSTABILITY, RIGHT KNEE: ICD-10-CM

## 2021-09-27 DIAGNOSIS — S83.411D SPRAIN OF MEDIAL COLLATERAL LIGAMENT OF RIGHT KNEE, SUBSEQUENT ENCOUNTER: ICD-10-CM

## 2021-09-27 DIAGNOSIS — S89.91XA UNSPECIFIED INJURY OF RIGHT LOWER LEG, INITIAL ENCOUNTER: ICD-10-CM

## 2021-09-27 PROCEDURE — 73721 MRI JNT OF LWR EXTRE W/O DYE: CPT

## 2021-10-01 ENCOUNTER — OFFICE VISIT (OUTPATIENT)
Dept: OBGYN CLINIC | Facility: CLINIC | Age: 26
End: 2021-10-01
Payer: COMMERCIAL

## 2021-10-01 VITALS
BODY MASS INDEX: 21.96 KG/M2 | WEIGHT: 153.4 LBS | HEART RATE: 113 BPM | TEMPERATURE: 97.8 F | DIASTOLIC BLOOD PRESSURE: 70 MMHG | SYSTOLIC BLOOD PRESSURE: 110 MMHG | HEIGHT: 70 IN

## 2021-10-01 DIAGNOSIS — M25.561 CHRONIC PAIN OF RIGHT KNEE: Primary | ICD-10-CM

## 2021-10-01 DIAGNOSIS — G89.29 CHRONIC PAIN OF RIGHT KNEE: Primary | ICD-10-CM

## 2021-10-01 DIAGNOSIS — Q68.2 PATELLA ALTA: ICD-10-CM

## 2021-10-01 PROCEDURE — 99244 OFF/OP CNSLTJ NEW/EST MOD 40: CPT | Performed by: ORTHOPAEDIC SURGERY

## 2021-10-01 RX ORDER — DESOGESTREL AND ETHINYL ESTRADIOL 0.15-0.03
KIT ORAL
COMMUNITY
Start: 2021-09-07

## 2021-10-05 ENCOUNTER — TELEPHONE (OUTPATIENT)
Dept: OBGYN CLINIC | Facility: CLINIC | Age: 26
End: 2021-10-05

## 2021-10-27 ENCOUNTER — IMMUNIZATIONS (OUTPATIENT)
Dept: FAMILY MEDICINE CLINIC | Facility: HOSPITAL | Age: 26
End: 2021-10-27

## 2021-10-27 DIAGNOSIS — Z23 ENCOUNTER FOR IMMUNIZATION: Primary | ICD-10-CM

## 2021-10-27 PROCEDURE — 0001A COVID-19 PFIZER VACC 0.3 ML: CPT

## 2021-10-27 PROCEDURE — 91300 COVID-19 PFIZER VACC 0.3 ML: CPT

## 2021-11-02 ENCOUNTER — APPOINTMENT (OUTPATIENT)
Dept: RADIOLOGY | Facility: MEDICAL CENTER | Age: 26
End: 2021-11-02
Payer: COMMERCIAL

## 2021-11-02 ENCOUNTER — OFFICE VISIT (OUTPATIENT)
Dept: OBGYN CLINIC | Facility: MEDICAL CENTER | Age: 26
End: 2021-11-02
Payer: COMMERCIAL

## 2021-11-02 VITALS
DIASTOLIC BLOOD PRESSURE: 84 MMHG | HEIGHT: 70 IN | HEART RATE: 114 BPM | BODY MASS INDEX: 21.9 KG/M2 | SYSTOLIC BLOOD PRESSURE: 127 MMHG | WEIGHT: 153 LBS

## 2021-11-02 DIAGNOSIS — M22.2X1 PATELLOFEMORAL PAIN SYNDROME OF BOTH KNEES: Primary | ICD-10-CM

## 2021-11-02 DIAGNOSIS — M22.2X2 PATELLOFEMORAL PAIN SYNDROME OF BOTH KNEES: Primary | ICD-10-CM

## 2021-11-02 DIAGNOSIS — G89.29 CHRONIC PAIN OF BOTH KNEES: ICD-10-CM

## 2021-11-02 DIAGNOSIS — M25.562 CHRONIC PAIN OF BOTH KNEES: ICD-10-CM

## 2021-11-02 DIAGNOSIS — M25.561 CHRONIC PAIN OF BOTH KNEES: ICD-10-CM

## 2021-11-02 DIAGNOSIS — Q68.2 PATELLA ALTA: ICD-10-CM

## 2021-11-02 PROCEDURE — 73564 X-RAY EXAM KNEE 4 OR MORE: CPT

## 2021-11-02 PROCEDURE — 99214 OFFICE O/P EST MOD 30 MIN: CPT | Performed by: ORTHOPAEDIC SURGERY

## 2021-11-15 ENCOUNTER — NURSE TRIAGE (OUTPATIENT)
Dept: OTHER | Facility: OTHER | Age: 26
End: 2021-11-15

## 2021-11-15 DIAGNOSIS — Z20.822 ENCOUNTER FOR SCREENING LABORATORY TESTING FOR COVID-19 VIRUS: Primary | ICD-10-CM

## 2021-11-15 PROCEDURE — U0003 INFECTIOUS AGENT DETECTION BY NUCLEIC ACID (DNA OR RNA); SEVERE ACUTE RESPIRATORY SYNDROME CORONAVIRUS 2 (SARS-COV-2) (CORONAVIRUS DISEASE [COVID-19]), AMPLIFIED PROBE TECHNIQUE, MAKING USE OF HIGH THROUGHPUT TECHNOLOGIES AS DESCRIBED BY CMS-2020-01-R: HCPCS | Performed by: FAMILY MEDICINE

## 2021-11-15 PROCEDURE — U0005 INFEC AGEN DETEC AMPLI PROBE: HCPCS | Performed by: FAMILY MEDICINE

## 2021-11-18 ENCOUNTER — EVALUATION (OUTPATIENT)
Dept: PHYSICAL THERAPY | Facility: CLINIC | Age: 26
End: 2021-11-18
Payer: COMMERCIAL

## 2021-11-18 DIAGNOSIS — G89.29 CHRONIC PAIN OF BOTH KNEES: ICD-10-CM

## 2021-11-18 DIAGNOSIS — M25.562 CHRONIC PAIN OF BOTH KNEES: ICD-10-CM

## 2021-11-18 DIAGNOSIS — M25.561 CHRONIC PAIN OF BOTH KNEES: ICD-10-CM

## 2021-11-18 PROCEDURE — 97535 SELF CARE MNGMENT TRAINING: CPT | Performed by: PHYSICAL THERAPIST

## 2021-11-18 PROCEDURE — 97161 PT EVAL LOW COMPLEX 20 MIN: CPT | Performed by: PHYSICAL THERAPIST

## 2021-11-24 ENCOUNTER — OFFICE VISIT (OUTPATIENT)
Dept: PHYSICAL THERAPY | Facility: CLINIC | Age: 26
End: 2021-11-24
Payer: COMMERCIAL

## 2021-11-24 DIAGNOSIS — M25.562 CHRONIC PAIN OF BOTH KNEES: Primary | ICD-10-CM

## 2021-11-24 DIAGNOSIS — M25.561 CHRONIC PAIN OF BOTH KNEES: Primary | ICD-10-CM

## 2021-11-24 DIAGNOSIS — G89.29 CHRONIC PAIN OF BOTH KNEES: Primary | ICD-10-CM

## 2021-11-24 PROCEDURE — 97110 THERAPEUTIC EXERCISES: CPT

## 2021-11-24 PROCEDURE — 97112 NEUROMUSCULAR REEDUCATION: CPT

## 2021-12-01 ENCOUNTER — OFFICE VISIT (OUTPATIENT)
Dept: PHYSICAL THERAPY | Facility: CLINIC | Age: 26
End: 2021-12-01
Payer: COMMERCIAL

## 2021-12-01 DIAGNOSIS — G89.29 CHRONIC PAIN OF BOTH KNEES: Primary | ICD-10-CM

## 2021-12-01 DIAGNOSIS — M25.562 CHRONIC PAIN OF BOTH KNEES: Primary | ICD-10-CM

## 2021-12-01 DIAGNOSIS — M25.561 CHRONIC PAIN OF BOTH KNEES: Primary | ICD-10-CM

## 2021-12-01 PROCEDURE — 97110 THERAPEUTIC EXERCISES: CPT | Performed by: PHYSICAL THERAPIST

## 2021-12-01 PROCEDURE — 97112 NEUROMUSCULAR REEDUCATION: CPT | Performed by: PHYSICAL THERAPIST

## 2021-12-01 PROCEDURE — 97140 MANUAL THERAPY 1/> REGIONS: CPT | Performed by: PHYSICAL THERAPIST

## 2021-12-02 ENCOUNTER — APPOINTMENT (OUTPATIENT)
Dept: PHYSICAL THERAPY | Facility: CLINIC | Age: 26
End: 2021-12-02
Payer: COMMERCIAL

## 2021-12-06 ENCOUNTER — OFFICE VISIT (OUTPATIENT)
Dept: PHYSICAL THERAPY | Facility: CLINIC | Age: 26
End: 2021-12-06
Payer: COMMERCIAL

## 2021-12-06 DIAGNOSIS — M25.561 CHRONIC PAIN OF BOTH KNEES: Primary | ICD-10-CM

## 2021-12-06 DIAGNOSIS — M25.562 CHRONIC PAIN OF BOTH KNEES: Primary | ICD-10-CM

## 2021-12-06 DIAGNOSIS — G89.29 CHRONIC PAIN OF BOTH KNEES: Primary | ICD-10-CM

## 2021-12-06 PROCEDURE — 97112 NEUROMUSCULAR REEDUCATION: CPT

## 2021-12-06 PROCEDURE — 97110 THERAPEUTIC EXERCISES: CPT

## 2021-12-06 PROCEDURE — 97140 MANUAL THERAPY 1/> REGIONS: CPT

## 2021-12-13 ENCOUNTER — EVALUATION (OUTPATIENT)
Dept: PHYSICAL THERAPY | Facility: CLINIC | Age: 26
End: 2021-12-13
Payer: COMMERCIAL

## 2021-12-13 DIAGNOSIS — M25.562 CHRONIC PAIN OF BOTH KNEES: Primary | ICD-10-CM

## 2021-12-13 DIAGNOSIS — M25.561 CHRONIC PAIN OF BOTH KNEES: Primary | ICD-10-CM

## 2021-12-13 DIAGNOSIS — G89.29 CHRONIC PAIN OF BOTH KNEES: Primary | ICD-10-CM

## 2021-12-13 PROCEDURE — 97140 MANUAL THERAPY 1/> REGIONS: CPT

## 2021-12-13 PROCEDURE — 97140 MANUAL THERAPY 1/> REGIONS: CPT | Performed by: PHYSICAL THERAPIST

## 2021-12-13 PROCEDURE — 97110 THERAPEUTIC EXERCISES: CPT

## 2021-12-13 PROCEDURE — 97112 NEUROMUSCULAR REEDUCATION: CPT

## 2021-12-14 ENCOUNTER — OFFICE VISIT (OUTPATIENT)
Dept: PHYSICAL THERAPY | Facility: CLINIC | Age: 26
End: 2021-12-14
Payer: COMMERCIAL

## 2021-12-14 DIAGNOSIS — G89.29 CHRONIC PAIN OF BOTH KNEES: Primary | ICD-10-CM

## 2021-12-14 DIAGNOSIS — M25.561 CHRONIC PAIN OF BOTH KNEES: Primary | ICD-10-CM

## 2021-12-14 DIAGNOSIS — M25.562 CHRONIC PAIN OF BOTH KNEES: Primary | ICD-10-CM

## 2021-12-14 PROCEDURE — 97110 THERAPEUTIC EXERCISES: CPT

## 2021-12-14 PROCEDURE — 97112 NEUROMUSCULAR REEDUCATION: CPT

## 2021-12-14 PROCEDURE — 97140 MANUAL THERAPY 1/> REGIONS: CPT

## 2021-12-20 ENCOUNTER — APPOINTMENT (OUTPATIENT)
Dept: PHYSICAL THERAPY | Facility: CLINIC | Age: 26
End: 2021-12-20
Payer: COMMERCIAL

## 2021-12-28 ENCOUNTER — APPOINTMENT (OUTPATIENT)
Dept: PHYSICAL THERAPY | Facility: CLINIC | Age: 26
End: 2021-12-28
Payer: COMMERCIAL

## 2021-12-30 ENCOUNTER — APPOINTMENT (OUTPATIENT)
Dept: PHYSICAL THERAPY | Facility: CLINIC | Age: 26
End: 2021-12-30
Payer: COMMERCIAL

## 2022-01-04 ENCOUNTER — EVALUATION (OUTPATIENT)
Dept: PHYSICAL THERAPY | Facility: CLINIC | Age: 27
End: 2022-01-04
Payer: COMMERCIAL

## 2022-01-04 DIAGNOSIS — M25.562 CHRONIC PAIN OF BOTH KNEES: Primary | ICD-10-CM

## 2022-01-04 DIAGNOSIS — G89.29 CHRONIC PAIN OF BOTH KNEES: Primary | ICD-10-CM

## 2022-01-04 DIAGNOSIS — M25.561 CHRONIC PAIN OF BOTH KNEES: Primary | ICD-10-CM

## 2022-01-04 PROCEDURE — 97110 THERAPEUTIC EXERCISES: CPT | Performed by: PHYSICAL THERAPIST

## 2022-01-04 PROCEDURE — 97535 SELF CARE MNGMENT TRAINING: CPT | Performed by: PHYSICAL THERAPIST

## 2022-01-04 NOTE — PROGRESS NOTES
Daily Note     Today's date: 2022  Patient name: [place]  : 1995  MRN: [id number]  Referring provider: Erika Moffett, *  Dx:   Encounter Diagnosis     ICD-10-CM    1  Chronic pain of both knees  M25 561     M25 562     G89 29                   Subjective: ***      Objective: See treatment diary below      Assessment: Tolerated treatment {Tolerated treatment :8987858742}   Patient {assessment:3944660539}      Plan: {PLAN:0525172241}     Precautions:  None       Manuals    Bilateral quad stretch  10'   10 min with bilat knee PA mobs  10'                           Neuro Re-Ed     BOSU March  2'   2' 2'   BOSU SLR 10x bilat   10x Bilat  10x bilat   Front and lateral lunge         TB Side step and resist Trunk rotation         TB Side step and squat  8x10' blue   6x10 Feet   Green  8x10' GREEN   Monster walk  8x10' blue   8x10 Feet  Green  8x10' GREEN   S/L Bridge  10x bilat   10x Bilat  3" Hold  10x bilat                           Ther Ex     [facility]  10' L1   10 min   L1  10' L1   Prone quad set  2x10 5"hold bilat   10x5" Hold Bilat 2x10 5"hold bilat   Prone mule kicks         S/L Clam shells  2x10 bilat blue   2x10 Bilat  Green  2x10 bilat GREEN   S/L Reverse clam shells  2x10 bilat   2x10 Bilat 2x10 bilat                   HEP update/review          Ther Activity                     Gait Training                     Modalities     CP to bilateral knee   15' MH to   B knee   15 min MHP Bilat Knees  15' MH to   B knee

## 2022-01-04 NOTE — PROGRESS NOTES
PT Discharge    Today's date: 2022  Patient name: [place]  : 1995  MRN: [id number]  Referring provider: Mally Reagan, *  Dx:   Encounter Diagnosis     ICD-10-CM    1  Chronic pain of both knees  M25 561     M25 562     G89 29                   Assessment  Assessment details: PT notes the patient with improved ROM and strength t/o the bilateral hip and LE and PT notes the patient has met MMI with current course of skilled therapy and is ready for a transition to HEP  Impairments: abnormal or restricted ROM, activity intolerance, impaired physical strength, lacks appropriate home exercise program and pain with function  Understanding of Dx/Px/POC: good   Prognosis: good    Goals  ST  Initiate HEP-MET  2  Decrease pain levels by 25-50%-Partial MET   3  Increase ROM by 25-50%-MET   4  Increase strength by 1-2 mm grades-MET  LT  Decrease limitations with standing, walking, and stairs-MET  2  Decrease limitations with squatting, lifting, and carrying-Partial MET   3  Decrease limitations with ADL-MET  4   DC with HEP-MET    Plan  Plan details: Transition to HEP  Patient would benefit from: skilled physical therapy  Planned modality interventions: cryotherapy  Planned therapy interventions: manual therapy, neuromuscular re-education, patient education, self care, strengthening, stretching, therapeutic exercise, home exercise program, graded exercise and flexibility  Frequency: 2x week  Duration in weeks: 1  Treatment plan discussed with: patient        Subjective Evaluation    History of Present Illness  Mechanism of injury: Patient reports dealing with increase bilateral knee pain for 6+ years with progressive worsening time leading to limitations with standing, walking, stairs, squatting, lifting, carrying, ADL, recreation and work duties    Patient reports she went to ortho MD for evaluation where she was treated with analgesic injections and medications with minimal change in status so patient was referred to OPPT to address bilateral knee pain  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  Presently the patient has attended 7 sessions of skilled therapy and feels improvement since the start of therapy but patient is unable to quantify  Patient reports continuation of soreness in the bilateral knees with limitations with squatting and stairs  Patient reports she is in agreement with PT recommendations of transition to HEP  Pain  Current pain ratin  At best pain ratin  At worst pain ratin  Location: Bilateral knee pain (R>L)   Quality: discomfort, dull ache, sharp and throbbing  Relieving factors: rest  Aggravating factors: stair climbing, walking, standing and lifting  Progression: improved      Diagnostic Tests  X-ray: abnormal  Treatments  Previous treatment: injection treatment and medication  Current treatment: medication and physical therapy  Patient Goals  Patient goals for therapy: decreased pain, increased motion, increased strength, independence with ADLs/IADLs and return to sport/leisure activities          Objective     Palpation   Left   Tenderness of the rectus femoris and vastus medialis  Right   Tenderness of the rectus femoris and vastus medialis  Tenderness   Left Knee   Tenderness in the lateral joint line, LCL (distal), LCL (proximal), MCL (distal), MCL (proximal), medial joint line, quadriceps tendon and superior patella  No tenderness in the patellar tendon  Right Knee   Tenderness in the lateral joint line, LCL (distal), LCL (proximal), MCL (distal), MCL (proximal), medial joint line, patellar tendon, quadriceps tendon and superior patella       Neurological Testing     Reflexes   Left   Patellar (L4): normal (2+)  Achilles (S1): normal (2+)    Right   Patellar (L4): normal (2+)  Achilles (S1): normal (2+)    Active Range of Motion   Left Hip   Normal active range of motion    Right Hip   Normal active range of motion  Left Knee   Normal active range of motion    Right Knee   Normal active range of motion    Mobility   Patellar Mobility:   Left Knee   Hypermobile: left medial, left lateral, left superior and left inferior      Right Knee   Hypermobile: medial, lateral, superior and inferior     Patellar Static Positioning   Left Knee: kyle  Right Knee: kyle    Strength/Myotome Testing     Left Hip   Planes of Motion   Flexion: 4+  Extension: 5  Abduction: 4+  Adduction: 5  External rotation: 4+  Internal rotation: 5    Right Hip   Planes of Motion   Flexion: 4+  Extension: 5  Abduction: 4+  Adduction: 5  External rotation: 4+  Internal rotation: 4+    Left Knee   Flexion: 5  Extension: 4+  Quadriceps contraction: good    Right Knee   Flexion: 5  Extension: 5  Quadriceps contraction: good    Tests     Left Hip   Negative KAMRYN, FADIR and long sit  Liam: Negative  90/90 SLR: Negative  Right Hip   Negative KAMRYN, FADIR and long sit  Liam: Negative  90/90 SLR: Negative  Left Knee   Negative anterior Lachman, lateral Blane, medial Blane, posterior Lachman, valgus stress test at 0 degrees and varus stress test at 0 degrees  Right Knee   Negative anterior Lachman, lateral Blane, medial Blane, posterior Lachman, valgus stress test at 0 degrees and varus stress test at 0 degrees  Swelling     Left Knee Girth Measurement (cm)   Joint line: 37 5 cm    Right Knee Girth Measurement (cm)   Joint line: 38 cm    Ambulation     Observational Gait   Gait: within functional limits   Walking speed and stride length within functional limits                Precautions:  None     Manuals 12/14 1/4 12/6 12/13   Bilateral quad stretch  10'   10 min with bilat knee PA mobs  10'                           Neuro Re-Ed     12/13   BOSU March  2'   2' 2'   BOSU SLR 10x bilat   10x Bilat  10x bilat   Front and lateral lunge         TB Side step and resist Trunk rotation         TB Side step and squat  8x10' blue   6x10 Feet   Green  8x10' GREEN   Monster walk  8x10' blue   8x10 Feet  Green  8x10' GREEN   S/L Bridge  10x bilat   10x Bilat  3" Hold  10x bilat                           Ther Ex     12/13   [facility]  10' L1 10 min L1   10 min   L1  10' L1   Prone quad set  2x10 5"hold bilat   10x5" Hold Bilat 2x10 5"hold bilat   Prone mule kicks         S/L Clam shells  2x10 bilat blue   2x10 Bilat  Green  2x10 bilat GREEN   S/L Reverse clam shells  2x10 bilat   2x10 Bilat 2x10 bilat                   HEP update/review    30 min       Ther Activity     12/13                   Gait Training     12/13                   Modalities     12/13   CP to bilateral knee   15' MH to   B knee MHP Bilat Knee   15 min   15 min MHP Bilat Knees  15' MH to   B knee

## 2022-04-15 ENCOUNTER — OFFICE VISIT (OUTPATIENT)
Dept: OBGYN CLINIC | Facility: MEDICAL CENTER | Age: 27
End: 2022-04-15
Payer: COMMERCIAL

## 2022-04-15 VITALS
SYSTOLIC BLOOD PRESSURE: 118 MMHG | DIASTOLIC BLOOD PRESSURE: 76 MMHG | BODY MASS INDEX: 24.05 KG/M2 | WEIGHT: 168 LBS | HEIGHT: 70 IN | HEART RATE: 105 BPM

## 2022-04-15 DIAGNOSIS — M25.561 CHRONIC PAIN OF BOTH KNEES: ICD-10-CM

## 2022-04-15 DIAGNOSIS — M22.2X1 PATELLOFEMORAL PAIN SYNDROME OF BOTH KNEES: Primary | ICD-10-CM

## 2022-04-15 DIAGNOSIS — Q68.2 PATELLA ALTA: ICD-10-CM

## 2022-04-15 DIAGNOSIS — M22.2X2 PATELLOFEMORAL PAIN SYNDROME OF BOTH KNEES: Primary | ICD-10-CM

## 2022-04-15 DIAGNOSIS — G89.29 CHRONIC PAIN OF BOTH KNEES: ICD-10-CM

## 2022-04-15 DIAGNOSIS — M25.562 CHRONIC PAIN OF BOTH KNEES: ICD-10-CM

## 2022-04-15 PROCEDURE — 99213 OFFICE O/P EST LOW 20 MIN: CPT | Performed by: ORTHOPAEDIC SURGERY

## 2022-04-15 NOTE — PROGRESS NOTES
Orthopaedic Surgery - Office Note  [place] (27 y o  female)   : 1995   MRN: [id number]  Encounter Date: 4/15/2022    Chief Complaint   Patient presents with    Right Knee - Pain, Swelling    Left Knee - Pain, Swelling       Assessment / Plan  Bilateral patellofemoral pain syndrome and patella kyle, with no improvement of symptoms following conservative treatment    · Discussed possibility of CSI, however it was not advised at this time as her knee is relatively healthy  · Activity as tolerated  · Continue HEP  · Anti-inflammatories or Tylenol prn pain  · Discussed possible hip work up if symptoms worse due to normal exam with unexplained continued pain  · No follow-ups on file  History of Present Illness  [place] is a 27 y o  female who presents today for follow-up evaluation bilateral knee pain  She states since her previous visit on 2021 she has completed a course of physical therapy for at least 2 months with continuing her exercise at home  She states she has had no improvement with her pain and feels like it has plateaued  She describes her pain as constantly achy that does worsen with activity especially with with long shifts at work  She also mentions an occasional tightness that originates at her knee and terminates in her calf  She mentions that she has not attempted any orthotics for her feet  She also states that she recently followed up with her primary care doctor who would like her to get genetic testing performed, this is scheduled for 2023  She denies any new injury or trauma to her knees  She denies any distal paresthesias  Review of Systems  Pertinent items are noted in HPI  All other systems were reviewed and are negative  Physical Exam  /76   Pulse 105   Ht 5' 10" (1 778 m)   Wt 76 2 kg (168 lb)   BMI 24 11 kg/m²   Cons: Appears well  No apparent distress  Psych: Alert  Oriented x3    Mood and affect normal   Eyes: PERRLA, EOMI  Resp: Normal effort  No audible wheezing or stridor  CV: Palpable pulse  No discernable arrhythmia  No LE edema  Lymph:  No palpable cervical, axillary, or inguinal lymphadenopathy  Skin: Warm  No palpable masses  No visible lesions  Neuro: Normal muscle tone  Normal and symmetric DTR's  Bilateral Knee Exam  Alignment:  Normal resting hip posture  Normal knee alignment  Inspection:  No swelling  No edema  Palpation:  No jointline tenderness  No effusion  ROM:  Knee Extension 0  Knee Flexion 120  Strength:  5/5 hip flexors and abductors  5/5 quadriceps and hamstrings  Stability:  No objective knee instability  Stable Varus / Valgus stress, Lachman, and Posterior drawer  Tests:  No pertinent positive or negative tests  Patella:  Normal patellar mobility  Noted patella kyle  Neurovascular:  Sensation intact in DP/SP/Gutierrez/Sa/T nerve distributions  2+ DP & PT pulses  Gait:  Normal        Studies Reviewed  XR of bilateral knee - Performed on 2022, reveal no osseous abnormality  MRI of right knee - Performed on 2022, reveal mild articular cartilage defect with patella kyle    Procedures  No procedures today  Medical, Surgical, Family, and Social History  The patient's medical history, family history, and social history, were reviewed and updated as appropriate      Past Medical History:   Diagnosis Date    Anxiety     Depression     Narcolepsy     Tachycardia        Past Surgical History:   Procedure Laterality Date     SECTION      NOSE SURGERY         Family History   Problem Relation Age of Onset    No Known Problems Mother     No Known Problems Father        Social History     Occupational History    Not on file   Tobacco Use    Smoking status: Current Every Day Smoker     Packs/day: 0 50     Years: 8 00     Pack years: 4 00     Types: Cigarettes    Smokeless tobacco: Never Used   Vaping Use    Vaping Use: Never used   Substance and Sexual Activity    Alcohol use: Yes    Drug use: Never    Sexual activity: Not on file       Allergies   Allergen Reactions    Diclofenac Rash     Topical caused rash    Latex Hives, Itching and Rash         Current Outpatient Medications:     albuterol (2 5 mg/3 mL) 0 083 % nebulizer solution, Inhale 2 5 mg, Disp: , Rfl:     albuterol (PROVENTIL HFA,VENTOLIN HFA) 90 mcg/act inhaler, Inhale 2 puffs, Disp: , Rfl:     Apri 0 15-30 MG-MCG per tablet, TAKE 1 TAB BY MOUTH DAILY   ONLY TAKING 4 [place] DAYS, Disp: , Rfl:     cyclobenzaprine (FLEXERIL) 10 mg tablet, Take 10 mg by mouth 3 (three) times a day as needed for muscle spasms, Disp: , Rfl:     FETZIMA 40 MG CP24, , Disp: , Rfl:     folic acid (FOLVITE) 1 mg tablet, Take 1 mg by mouth, Disp: , Rfl:     lamoTRIgine (LaMICtal) 150 MG tablet, Take 150 mg by mouth daily, Disp: , Rfl: 2    Levomilnacipran HCl ER (FETZIMA) 40 MG CP24, Take by mouth, Disp: , Rfl:     Acetaminophen 500 MG, Take 500 mg by mouth, Disp: , Rfl:     traMADol (ULTRAM) 50 mg tablet, Take 50 mg by mouth, Disp: , Rfl:       Smith Pop    Scribe Attestation    I,:  Smith Pop am acting as a scribe while in the presence of the attending physician :       I,:  Mary Abernathy MD personally performed the services described in this documentation    as scribed in my presence :

## 2022-09-23 ENCOUNTER — APPOINTMENT (EMERGENCY)
Dept: CT IMAGING | Facility: HOSPITAL | Age: 27
End: 2022-09-23
Payer: COMMERCIAL

## 2022-09-23 ENCOUNTER — HOSPITAL ENCOUNTER (EMERGENCY)
Facility: HOSPITAL | Age: 27
Discharge: HOME | End: 2022-09-23
Attending: EMERGENCY MEDICINE
Payer: COMMERCIAL

## 2022-09-23 VITALS
BODY MASS INDEX: 22.19 KG/M2 | RESPIRATION RATE: 18 BRPM | DIASTOLIC BLOOD PRESSURE: 73 MMHG | OXYGEN SATURATION: 100 % | WEIGHT: 155 LBS | TEMPERATURE: 98.4 F | HEART RATE: 109 BPM | SYSTOLIC BLOOD PRESSURE: 106 MMHG | HEIGHT: 70 IN

## 2022-09-23 DIAGNOSIS — R10.2 PELVIC PAIN: Primary | ICD-10-CM

## 2022-09-23 LAB
ALBUMIN SERPL BCP-MCNC: 3.8 G/DL (ref 3.5–5)
ALP SERPL-CCNC: 100 U/L (ref 46–116)
ALT SERPL W P-5'-P-CCNC: 28 U/L (ref 12–78)
ANION GAP SERPL CALCULATED.3IONS-SCNC: 11 MMOL/L (ref 4–13)
AST SERPL W P-5'-P-CCNC: 15 U/L (ref 5–45)
BASOPHILS # BLD AUTO: 0.04 THOUSANDS/ΜL (ref 0–0.1)
BASOPHILS NFR BLD AUTO: 1 % (ref 0–1)
BILIRUB SERPL-MCNC: 0.28 MG/DL (ref 0.2–1)
BUN SERPL-MCNC: 7 MG/DL (ref 5–25)
CALCIUM SERPL-MCNC: 9.3 MG/DL (ref 8.3–10.1)
CHLORIDE SERPL-SCNC: 100 MMOL/L (ref 96–108)
CO2 SERPL-SCNC: 27 MMOL/L (ref 21–32)
CREAT SERPL-MCNC: 0.88 MG/DL (ref 0.6–1.3)
EOSINOPHIL # BLD AUTO: 0.33 THOUSAND/ΜL (ref 0–0.61)
EOSINOPHIL NFR BLD AUTO: 5 % (ref 0–6)
ERYTHROCYTE [DISTWIDTH] IN BLOOD BY AUTOMATED COUNT: 12.3 % (ref 11.6–15.1)
EXT PREG TEST URINE: NEGATIVE
EXT. CONTROL ED NAV: NORMAL
GFR SERPL CREATININE-BSD FRML MDRD: 89 ML/MIN/1.73SQ M
GLUCOSE SERPL-MCNC: 85 MG/DL (ref 65–140)
HCT VFR BLD AUTO: 40.1 % (ref 34.8–46.1)
HGB BLD-MCNC: 13.7 G/DL (ref 11.5–15.4)
IMM GRANULOCYTES # BLD AUTO: 0.02 THOUSAND/UL (ref 0–0.2)
IMM GRANULOCYTES NFR BLD AUTO: 0 % (ref 0–2)
LYMPHOCYTES # BLD AUTO: 2.5 THOUSANDS/ΜL (ref 0.6–4.47)
LYMPHOCYTES NFR BLD AUTO: 36 % (ref 14–44)
MCH RBC QN AUTO: 32.2 PG (ref 26.8–34.3)
MCHC RBC AUTO-ENTMCNC: 34.2 G/DL (ref 31.4–37.4)
MCV RBC AUTO: 94 FL (ref 82–98)
MONOCYTES # BLD AUTO: 0.33 THOUSAND/ΜL (ref 0.17–1.22)
MONOCYTES NFR BLD AUTO: 5 % (ref 4–12)
NEUTROPHILS # BLD AUTO: 3.64 THOUSANDS/ΜL (ref 1.85–7.62)
NEUTS SEG NFR BLD AUTO: 53 % (ref 43–75)
NRBC BLD AUTO-RTO: 0 /100 WBCS
PLATELET # BLD AUTO: 265 THOUSANDS/UL (ref 149–390)
PMV BLD AUTO: 8.8 FL (ref 8.9–12.7)
POTASSIUM SERPL-SCNC: 3.4 MMOL/L (ref 3.5–5.3)
PROT SERPL-MCNC: 8.1 G/DL (ref 6.4–8.4)
RBC # BLD AUTO: 4.25 MILLION/UL (ref 3.81–5.12)
SODIUM SERPL-SCNC: 138 MMOL/L (ref 135–147)
WBC # BLD AUTO: 6.86 THOUSAND/UL (ref 4.31–10.16)

## 2022-09-23 PROCEDURE — 80053 COMPREHEN METABOLIC PANEL: CPT | Performed by: EMERGENCY MEDICINE

## 2022-09-23 PROCEDURE — 99285 EMERGENCY DEPT VISIT HI MDM: CPT | Performed by: EMERGENCY MEDICINE

## 2022-09-23 PROCEDURE — 96375 TX/PRO/DX INJ NEW DRUG ADDON: CPT

## 2022-09-23 PROCEDURE — 36415 COLL VENOUS BLD VENIPUNCTURE: CPT | Performed by: EMERGENCY MEDICINE

## 2022-09-23 PROCEDURE — 74177 CT ABD & PELVIS W/CONTRAST: CPT

## 2022-09-23 PROCEDURE — 99284 EMERGENCY DEPT VISIT MOD MDM: CPT

## 2022-09-23 PROCEDURE — 96374 THER/PROPH/DIAG INJ IV PUSH: CPT

## 2022-09-23 PROCEDURE — 81025 URINE PREGNANCY TEST: CPT | Performed by: EMERGENCY MEDICINE

## 2022-09-23 PROCEDURE — 85025 COMPLETE CBC W/AUTO DIFF WBC: CPT | Performed by: EMERGENCY MEDICINE

## 2022-09-23 PROCEDURE — G1004 CDSM NDSC: HCPCS

## 2022-09-23 RX ORDER — ONDANSETRON 2 MG/ML
4 INJECTION INTRAMUSCULAR; INTRAVENOUS ONCE
Status: COMPLETED | OUTPATIENT
Start: 2022-09-23 | End: 2022-09-23

## 2022-09-23 RX ADMIN — ONDANSETRON 4 MG: 2 INJECTION INTRAMUSCULAR; INTRAVENOUS at 14:17

## 2022-09-23 RX ADMIN — MORPHINE SULFATE 2 MG: 2 INJECTION, SOLUTION INTRAMUSCULAR; INTRAVENOUS at 14:17

## 2022-09-23 RX ADMIN — IOHEXOL 100 ML: 350 INJECTION, SOLUTION INTRAVENOUS at 14:58

## 2022-09-23 NOTE — ED PROVIDER NOTES
History  Chief Complaint   Patient presents with    Abdominal Pain     Pt reports RLQ abd pain since yesterday, denies any V/D however is nauseated  Patient is 64-year-old female with a past surgical history only significant for  section who presents emergency room with chief complaint of right lower quadrant abdominal pain and mild nausea without any vomiting which started yesterday  No fevers or chills  No diarrhea  Able tolerate p o  Intake  No urinary complaints as a burning or stinging or frequency  No vaginal complaints such as abnormal bleeding  Patient reports that she is on PCPs and has a normal menstrual cycle  Most recent was 3 weeks ago and was normal   Patient reports the pain is worse with palpation  History provided by:  Patient and parent  Abdominal Pain  Pain location:  RLQ  Pain quality: aching, sharp and shooting    Pain radiates to:  Does not radiate  Pain severity:  Severe  Onset quality:  Gradual  Timing:  Intermittent  Progression:  Waxing and waning  Chronicity:  New  Context: previous surgery    Context: not alcohol use, not awakening from sleep, not eating and not recent illness    Relieved by:  None tried  Worsened by:  Palpation and movement  Ineffective treatments:  None tried  Associated symptoms: nausea    Associated symptoms: no anorexia, no belching, no chest pain, no chills, no constipation, no cough, no diarrhea, no dysuria, no fatigue, no fever, no hematemesis, no hematochezia, no shortness of breath, no sore throat, no vaginal bleeding, no vaginal discharge and no vomiting        Prior to Admission Medications   Prescriptions Last Dose Informant Patient Reported? Taking? Acetaminophen 500 MG Not Taking at Unknown time  Yes No   Sig: Take 500 mg by mouth   Patient not taking: Reported on 2022   Apri 0 15-30 MG-MCG per tablet 2022 at Unknown time  Yes Yes   Sig: TAKE 1 TAB BY MOUTH DAILY   ONLY TAKING 4 PLACEBO DAYS   Levomilnacipran HCl ER ([address] [place] [address]) 40 MG extended release capsule 2022 at Unknown time  Yes Yes   Sig: Take by mouth   albuterol (2 5 mg/3 mL) 0 083 % nebulizer solution Unknown at Unknown time  Yes No   Sig: Inhale 2 5 mg   albuterol (PROVENTIL HFA,VENTOLIN HFA) 90 mcg/act inhaler Unknown at Unknown time  Yes No   Sig: Inhale 2 puffs   cyclobenzaprine (FLEXERIL) 10 mg tablet 2022 at Unknown time  Yes Yes   Sig: Take 10 mg by mouth 3 (three) times a day as needed for muscle spasms   folic acid (FOLVITE) 1 mg tablet 2022 at Unknown time  Yes Yes   Sig: Take 1 mg by mouth   lamoTRIgine (LaMICtal) 150 MG tablet 2022 at Unknown time  Yes Yes   Sig: Take 150 mg by mouth daily      Facility-Administered Medications: None       Past Medical History:   Diagnosis Date    Anxiety     Depression     Narcolepsy     Tachycardia        Past Surgical History:   Procedure Laterality Date     SECTION      NOSE SURGERY         Family History   Problem Relation Age of Onset    No Known Problems Mother     No Known Problems Father      I have reviewed and agree with the history as documented  E-Cigarette/Vaping    E-Cigarette Use Never User      E-Cigarette/Vaping Substances     Social History     Tobacco Use    Smoking status: Current Every Day Smoker     Packs/day: 0 50     Years: 8 00     Pack years: 4 00     Types: Cigarettes    Smokeless tobacco: Never Used   Vaping Use    Vaping Use: Never used   Substance Use Topics    Alcohol use: Yes    Drug use: Never       Review of Systems   Constitutional: Negative  Negative for activity change, chills, fatigue and fever  HENT: Negative  Negative for congestion, ear pain, rhinorrhea, sinus pressure and sore throat  Eyes: Negative  Respiratory: Negative  Negative for cough, chest tightness, shortness of breath and wheezing  Cardiovascular: Negative  Negative for chest pain, palpitations and leg swelling     Gastrointestinal: Positive for abdominal pain and nausea  Negative for anorexia, constipation, diarrhea, hematemesis, hematochezia and vomiting  Endocrine: Negative  Genitourinary: Negative  Negative for dysuria, flank pain, frequency, vaginal bleeding and vaginal discharge  Musculoskeletal: Negative  Negative for arthralgias, back pain and myalgias  Skin: Negative  Negative for rash  Allergic/Immunologic: Negative  Neurological: Negative for dizziness, weakness, light-headedness and headaches  Hematological: Negative  Psychiatric/Behavioral: Negative  All other systems reviewed and are negative  Physical Exam  Physical Exam  Vitals and nursing note reviewed  Constitutional:       General: She is awake  Appearance: Normal appearance  She is well-developed  She is not ill-appearing or toxic-appearing  HENT:      Head: Normocephalic and atraumatic  Right Ear: External ear normal       Left Ear: External ear normal       Nose: Nose normal       Mouth/Throat:      Mouth: Mucous membranes are moist    Eyes:      General: Lids are normal  No scleral icterus  Extraocular Movements: Extraocular movements intact  Pupils: Pupils are equal, round, and reactive to light  Cardiovascular:      Rate and Rhythm: Normal rate and regular rhythm  Heart sounds: Normal heart sounds  No murmur heard  Pulmonary:      Effort: Pulmonary effort is normal  No respiratory distress  Breath sounds: Normal breath sounds  No wheezing, rhonchi or rales  Abdominal:      General: Abdomen is flat  There is no distension  Palpations: Abdomen is soft  Tenderness: There is abdominal tenderness in the right lower quadrant  There is no guarding or rebound  Hernia: No hernia is present  Musculoskeletal:         General: No swelling, tenderness or deformity  Normal range of motion  Cervical back: Normal range of motion and neck supple  Skin:     General: Skin is warm and dry        Coloration: Skin is not jaundiced or pale       Findings: No rash  Neurological:      Mental Status: She is alert and oriented to person, place, and time  Mental status is at baseline  Cranial Nerves: No cranial nerve deficit  Motor: No weakness     Psychiatric:         Attention and Perception: Attention normal          Mood and Affect: Mood normal          Speech: Speech normal          Behavior: Behavior normal          Vital Signs  ED Triage Vitals [09/23/22 1330]   Temperature Pulse Respirations Blood Pressure SpO2   98 4 °F (36 9 °C) (!) 138 20 128/76 100 %      Temp Source Heart Rate Source Patient Position - Orthostatic VS BP Location FiO2 (%)   Temporal Monitor Sitting Left arm --      Pain Score       8           Vitals:    09/23/22 1330 09/23/22 1515 09/23/22 1530   BP: 128/76 123/68 106/73   Pulse: (!) 138 (!) 113 (!) 109   Patient Position - Orthostatic VS: Sitting Sitting Sitting         Visual Acuity      ED Medications  Medications   ondansetron (ZOFRAN) injection 4 mg (4 mg Intravenous Given 9/23/22 1417)   morphine injection 2 mg (2 mg Intravenous Given 9/23/22 1417)   iohexol (OMNIPAQUE) 350 MG/ML injection (SINGLE-DOSE) 100 mL (100 mL Intravenous Given 9/23/22 1458)       Diagnostic Studies  Results Reviewed     Procedure Component Value Units Date/Time    Comprehensive metabolic panel [[id number]]  (Abnormal) Collected: 09/23/22 1415    Lab Status: Final result Specimen: Blood from Arm, Right Updated: 09/23/22 1440     Sodium 138 mmol/L      Potassium 3 4 mmol/L      Chloride 100 mmol/L      CO2 27 mmol/L      ANION GAP 11 mmol/L      BUN 7 mg/dL      Creatinine 0 88 mg/dL      Glucose 85 mg/dL      Calcium 9 3 mg/dL      AST 15 U/L      ALT 28 U/L      Alkaline Phosphatase 100 U/L      Total Protein 8 1 g/dL      Albumin 3 8 g/dL      Total Bilirubin 0 28 mg/dL      eGFR 89 ml/min/1 73sq m     Narrative:      Brian guidelines for Chronic Kidney Disease (CKD):     Stage 1 with normal or high GFR (GFR > 90 mL/min/1 73 square meters)    Stage 2 Mild CKD (GFR = 60-89 mL/min/1 73 square meters)    Stage 3A Moderate CKD (GFR = 45-59 mL/min/1 73 square meters)    Stage 3B Moderate CKD (GFR = 30-44 mL/min/1 73 square meters)    Stage 4 Severe CKD (GFR = 15-29 mL/min/1 73 square meters)    Stage 5 End Stage CKD (GFR <15 mL/min/1 73 square meters)  Note: GFR calculation is accurate only with a steady state creatinine    CBC and differential [[id number]]  (Abnormal) Collected: 09/23/22 1415    Lab Status: Final result Specimen: Blood from Arm, Right Updated: 09/23/22 1424     WBC 6 86 Thousand/uL      RBC 4 25 Million/uL      Hemoglobin 13 7 g/dL      Hematocrit 40 1 %      MCV 94 fL      MCH 32 2 pg      MCHC 34 2 g/dL      RDW 12 3 %      MPV 8 8 fL      Platelets 609 Thousands/uL      nRBC 0 /100 WBCs      Neutrophils Relative 53 %      Immat GRANS % 0 %      Lymphocytes Relative 36 %      Monocytes Relative 5 %      Eosinophils Relative 5 %      Basophils Relative 1 %      Neutrophils Absolute 3 64 Thousands/µL      Immature Grans Absolute 0 02 Thousand/uL      Lymphocytes Absolute 2 50 Thousands/µL      Monocytes Absolute 0 33 Thousand/µL      Eosinophils Absolute 0 33 Thousand/µL      Basophils Absolute 0 04 Thousands/µL     POCT pregnancy, urine [[id number]]  (Normal) Resulted: 09/23/22 1405    Lab Status: Final result Updated: 09/23/22 1405     EXT PREG TEST UR (Ref: Negative) NEGATIVE     Control Valid                 CT abdomen pelvis with contrast   Final Result by Siri Olivas MD (09/23 1520)      Unremarkable CT of the abdomen and pelvis  No CT findings to explain the patient's right lower quadrant abdominal pain  Workstation performed: [id number]                    Procedures  Procedures         ED Course  ED Course as of 09/23/22 1535   Fri Sep 23, 2022   1448 PREGNANCY TEST URINE: NEGATIVE   1533 Reviewed findings with patient at bedside    Differential diagnosis could include a ruptured ovarian cyst versus a intermittent torsion  Recommended follow-up with gynecology                               SBIRT 22yo+    Flowsheet Row Most Recent Value   SBIRT (25 yo +)    In order to provide better care to our patients, we are screening all of our patients for alcohol and drug use  Would it be okay to ask you these screening questions? Yes Filed at: 09/23/2022 1415   Initial Alcohol Screen: US AUDIT-C     1  How often do you have a drink containing alcohol? 0 Filed at: 09/23/2022 1415   2  How many drinks containing alcohol do you have on a typical day you are drinking? 0 Filed at: 09/23/2022 1415   3b  FEMALE Any Age, or MALE 65+: How often do you have 4 or more drinks on one occassion? 0 Filed at: 09/23/2022 1415   Audit-C Score 0 Filed at: 09/23/2022 1415   GURDEEP: How many times in the past year have you    Used an illegal drug or used a prescription medication for non-medical reasons? Never Filed at: 09/23/2022 1415                    MDM  Number of Diagnoses or Management Options  Pelvic pain: new and requires workup  Diagnosis management comments: Nonsurgical abdomen at time of discharge       Amount and/or Complexity of Data Reviewed  Clinical lab tests: ordered and reviewed  Tests in the radiology section of CPT®: ordered    Risk of Complications, Morbidity, and/or Mortality  Presenting problems: moderate  Diagnostic procedures: moderate  Management options: moderate    Patient Progress  Patient progress: improved      Disposition  Final diagnoses:   Pelvic pain     Time reflects when diagnosis was documented in both MDM as applicable and the Disposition within this note     Time User Action Codes Description Comment    9/23/2022  3:34 PM Charles Chopra [R10 2] Pelvic pain       ED Disposition     ED Disposition   Discharge    Condition   Stable    Date/Time   Fri Sep 23, 2022  3:34 PM    Danisha Reaves 76 discharge to home/self care                 Follow-up Information     Follow up With Specialties Details Why Contact Info    Gabi Mcgrath MD Internal Medicine  As needed [address] [facility]  [phone]            Patient's Medications   Discharge Prescriptions    No medications on file       No discharge procedures on file      PDMP Review     None          ED Provider  Electronically Signed by           Brianne Lenz DO  09/23/22 9221

## 2022-09-29 ENCOUNTER — HOSPITAL ENCOUNTER (OUTPATIENT)
Dept: PULMONOLOGY | Facility: HOSPITAL | Age: 27
End: 2022-09-29
Payer: COMMERCIAL

## 2022-09-29 DIAGNOSIS — R00.0 TACHYCARDIA, UNSPECIFIED: ICD-10-CM

## 2022-09-29 PROCEDURE — 94729 DIFFUSING CAPACITY: CPT | Performed by: INTERNAL MEDICINE

## 2022-09-29 PROCEDURE — 94060 EVALUATION OF WHEEZING: CPT

## 2022-09-29 PROCEDURE — 94729 DIFFUSING CAPACITY: CPT

## 2022-09-29 PROCEDURE — 94760 N-INVAS EAR/PLS OXIMETRY 1: CPT

## 2022-09-29 PROCEDURE — 94726 PLETHYSMOGRAPHY LUNG VOLUMES: CPT

## 2022-09-29 PROCEDURE — 94060 EVALUATION OF WHEEZING: CPT | Performed by: INTERNAL MEDICINE

## 2022-09-29 PROCEDURE — 94726 PLETHYSMOGRAPHY LUNG VOLUMES: CPT | Performed by: INTERNAL MEDICINE

## 2022-09-29 RX ORDER — ALBUTEROL SULFATE 2.5 MG/3ML
2.5 SOLUTION RESPIRATORY (INHALATION) ONCE AS NEEDED
Status: DISCONTINUED | OUTPATIENT
Start: 2022-09-29 | End: 2022-09-29

## 2022-09-29 RX ORDER — LEVALBUTEROL 1.25 MG/.5ML
1.25 SOLUTION, CONCENTRATE RESPIRATORY (INHALATION) ONCE
Status: COMPLETED | OUTPATIENT
Start: 2022-09-29 | End: 2022-09-29

## 2022-09-29 RX ORDER — SODIUM CHLORIDE FOR INHALATION 0.9 %
3 VIAL, NEBULIZER (ML) INHALATION ONCE
Status: COMPLETED | OUTPATIENT
Start: 2022-09-29 | End: 2022-09-29

## 2022-09-29 RX ADMIN — LEVALBUTEROL 1.25 MG: 1.25 SOLUTION, CONCENTRATE RESPIRATORY (INHALATION) at 08:41

## 2022-09-29 RX ADMIN — Medication 3 ML: at 08:43

## 2022-10-21 ENCOUNTER — HOSPITAL ENCOUNTER (OUTPATIENT)
Dept: NON INVASIVE DIAGNOSTICS | Facility: HOSPITAL | Age: 27
Discharge: HOME | End: 2022-10-21
Payer: COMMERCIAL

## 2022-10-21 VITALS
WEIGHT: 155 LBS | DIASTOLIC BLOOD PRESSURE: 78 MMHG | SYSTOLIC BLOOD PRESSURE: 116 MMHG | HEIGHT: 70 IN | BODY MASS INDEX: 22.19 KG/M2 | HEART RATE: 89 BPM

## 2022-10-21 DIAGNOSIS — R00.0 TACHYCARDIA, UNSPECIFIED: ICD-10-CM

## 2022-10-21 LAB
AORTIC ROOT: 2.9 CM
APICAL FOUR CHAMBER EJECTION FRACTION: 57 %
AV LVOT MEAN GRADIENT: 2 MMHG
AV LVOT PEAK GRADIENT: 4 MMHG
DOP CALC LVOT PEAK VEL VTI: 14.89 CM
DOP CALC LVOT PEAK VEL: 0.95 M/S
DOP CALC RVOT PEAK VEL: 0.95 M/S
DOP CALC RVOT VTI: 19.19 CM
E WAVE DECELERATION TIME: 155 MS
FRACTIONAL SHORTENING: 42 (ref 28–44)
INTERVENTRICULAR SEPTUM IN DIASTOLE (PARASTERNAL SHORT AXIS VIEW): 0.7 CM
INTERVENTRICULAR SEPTUM: 0.7 CM (ref 0.6–1.1)
LAAS-AP2: 9.8 CM2
LAAS-AP4: 12.6 CM2
LEFT ATRIUM SIZE: 2.9 CM
LEFT INTERNAL DIMENSION IN SYSTOLE: 2.5 CM (ref 2.1–4)
LEFT VENTRICULAR INTERNAL DIMENSION IN DIASTOLE: 4.3 CM (ref 3.5–6)
LEFT VENTRICULAR POSTERIOR WALL IN END DIASTOLE: 0.8 CM
LEFT VENTRICULAR STROKE VOLUME: 58 ML
LVSV (TEICH): 58 ML
MV E'TISSUE VEL-SEP: 17 CM/S
MV PEAK A VEL: 0.64 M/S
MV PEAK E VEL: 78 CM/S
MV STENOSIS PRESSURE HALF TIME: 45 MS
MV VALVE AREA P 1/2 METHOD: 4.89
RIGHT VENTRICLE ID DIMENSION: 2 CM
SL CV LEFT ATRIUM LENGTH A2C: 3.8 CM
SL CV LV EF: 60
SL CV PED ECHO LEFT VENTRICLE DIASTOLIC VOLUME (MOD BIPLANE) 2D: 81 ML
SL CV PED ECHO LEFT VENTRICLE SYSTOLIC VOLUME (MOD BIPLANE) 2D: 23 ML
SL CV RVOT MAX GRADIENT: 4 MMHG
SL CV RVOT MEAN GRADIENT: 2 MMHG
SL CV RVOT VMEAN: 0.75 M/S
TR MAX PG: 23 MMHG
TR PEAK VELOCITY: 2.4 M/S
TRICUSPID VALVE PEAK REGURGITATION VELOCITY: 2.39 M/S

## 2022-10-21 PROCEDURE — 93306 TTE W/DOPPLER COMPLETE: CPT

## 2022-10-21 PROCEDURE — 93306 TTE W/DOPPLER COMPLETE: CPT | Performed by: INTERNAL MEDICINE

## 2022-11-04 ENCOUNTER — TELEPHONE (OUTPATIENT)
Dept: PULMONOLOGY | Facility: CLINIC | Age: 27
End: 2022-11-04

## 2022-11-30 ENCOUNTER — TELEPHONE (OUTPATIENT)
Dept: PULMONOLOGY | Facility: CLINIC | Age: 27
End: 2022-11-30

## 2022-11-30 ENCOUNTER — TELEPHONE (OUTPATIENT)
Dept: OTHER | Facility: OTHER | Age: 27
End: 2022-11-30

## 2022-11-30 NOTE — TELEPHONE ENCOUNTER
Patient is calling regarding cancelling an appointment      Date/Time: 11/30/2022 @ 0900    Patient was rescheduled: YES [] NO [x]    Patient requesting call back to reschedule: YES [x] NO []

## 2022-12-01 NOTE — TELEPHONE ENCOUNTER
Sent letter to patient          Left second message for patient to call the office to reschedule her appointment

## 2023-01-04 ENCOUNTER — APPOINTMENT (OUTPATIENT)
Dept: LAB | Facility: HOSPITAL | Age: 28
End: 2023-01-04
Attending: INTERNAL MEDICINE

## 2023-01-04 ENCOUNTER — OFFICE VISIT (OUTPATIENT)
Dept: PULMONOLOGY | Facility: CLINIC | Age: 28
End: 2023-01-04

## 2023-01-04 VITALS
HEIGHT: 70 IN | SYSTOLIC BLOOD PRESSURE: 108 MMHG | WEIGHT: 161.8 LBS | TEMPERATURE: 97 F | HEART RATE: 110 BPM | DIASTOLIC BLOOD PRESSURE: 76 MMHG | BODY MASS INDEX: 23.16 KG/M2 | RESPIRATION RATE: 16 BRPM

## 2023-01-04 DIAGNOSIS — E88.01 AAT (ALPHA-1-ANTITRYPSIN) DEFICIENCY (HCC): Primary | ICD-10-CM

## 2023-01-04 DIAGNOSIS — F17.210 CIGARETTE NICOTINE DEPENDENCE WITHOUT COMPLICATION: ICD-10-CM

## 2023-01-04 DIAGNOSIS — E88.01 AAT (ALPHA-1-ANTITRYPSIN) DEFICIENCY (HCC): ICD-10-CM

## 2023-01-04 DIAGNOSIS — E88.01 ALPHA-1-ANTITRYPSIN DEFICIENCY (HCC): ICD-10-CM

## 2023-01-04 NOTE — ASSESSMENT & PLAN NOTE
We spent much of today's visit discussing the importance of smoking cessation  She verbalizes understanding  With her mental health issues, I would likely avoid Chantix  I would favor using nicotine replacement either in the form of patch, gum and/or lozenges  She verbalizes understanding and states she will make an effort for complete cessation

## 2023-01-04 NOTE — PROGRESS NOTES
Pulmonary Outpatient Consultation Note   [place] 32 y o  female MRN: [id number]  1/4/2023    Referring provider: Lynn Tejeda Md  [address]     Assessment/Plan:      Alpha-1-antitrypsin deficiency [facility])  Patient recently underwent screening for alpha 1 antitrypsin deficiency and was told she has "S subtype"  I presume she is a carrier, but need additional confirmatory testing  She will have blood work, including an alpha 1 antitrypsin level and phenotype  She understands that smoking cessation is of utmost importance  If she is able to achieve smoking cessation, she should not develop any problems in the future, particularly if she is a carrier  Even if she has true deficiency, smoking cessation would be the primary focus  Her PFTs were normal and she has no emphysema on imaging  Cigarette nicotine dependence without complication  We spent much of today's visit discussing the importance of smoking cessation  She verbalizes understanding  With her mental health issues, I would likely avoid Chantix  I would favor using nicotine replacement either in the form of patch, gum and/or lozenges  She verbalizes understanding and states she will make an effort for complete cessation  I will call her after testing is complete  She will also be following up with the genetics team at [facility] next month for additional comprehensive testing given her hypermobility and chronic pain issues  Visit orders:    Diagnoses and all orders for this visit:    AAT (alpha-1-antitrypsin) deficiency ([facility] [address] )  -     Alpha 1 Antitrypsin Phenotype; Future  -     Alpha-1-antitrypsin; Future  -     Alpha 1 Antitrypsin Phenotype    Alpha-1-antitrypsin deficiency (HCC)    Cigarette nicotine dependence without complication        History of Present Illness   HPI:  [place] is a 32 y o  female who is here today for referral regarding alpha-1 antitrypsin deficiency    Patient was diagnosed with asthma as a teenager  Asthma has not been much of an issue and she does not use inhalers on any regular basis  She has no cough, wheeze or sputum production  She underwent PFTs in September and during testing, she was offered alpha-1 antitrypsin screening via buccal swab  She was informed by her primary care physician that she was abnormal with "S subtype"  I have searched care everywhere records but do not see an official report  Patient does not have any known underlying liver disease  She has significant family history for COPD in her paternal aunt and uncle  Her mother's side is unknown, as she was adopted  She does have a son who will be 15 in February and does not have any pulmonary issues  She has a sister, without lung problems  She will be seeing a  in February secondary to hypermobility and chronic pain issues  Patient is currently smoking half pack per day and states that she has been trying to cut back  She quit several years ago with the assistance of Chantix  She has a history of anxiety and depression, currently on medications  Review of Systems   Constitutional: Negative for chills, fever and unexpected weight change  HENT: Negative for postnasal drip and sore throat  Eyes: Negative for visual disturbance  Respiratory:        As noted in HPI   Cardiovascular: Positive for palpitations  Negative for chest pain  Gastrointestinal: Negative for abdominal pain, diarrhea and vomiting  Musculoskeletal: Positive for arthralgias  Skin: Negative for rash  Neurological: Negative for headaches  Hematological: Negative for adenopathy  Psychiatric/Behavioral:        Per HPI   All other systems reviewed and are negative          Historical Information   Past Medical History:   Diagnosis Date   • Anxiety    • Depression    • Narcolepsy    • Tachycardia      Past Surgical History:   Procedure Laterality Date   •  SECTION     • NOSE SURGERY Family History   Problem Relation Age of Onset   • No Known Problems Mother    • No Known Problems Father        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    Social History     Tobacco Use   Smoking Status Every Day   • Packs/day: 0 50   • Years: 8 00   • Pack years: 4 00   • Types: Cigarettes   Smokeless Tobacco Never       Meds/Allergies     Current Outpatient Medications:   •  albuterol (2 5 mg/3 mL) 0 083 % nebulizer solution, Inhale 2 5 mg, Disp: , Rfl:   •  albuterol (PROVENTIL HFA,VENTOLIN HFA) 90 mcg/act inhaler, Inhale 2 puffs, Disp: , Rfl:   •  Apri 0 15-30 MG-MCG per tablet, TAKE 1 TAB BY MOUTH DAILY  ONLY TAKING 4 PLACEBO DAYS, Disp: , Rfl:   •  cyclobenzaprine (FLEXERIL) 10 mg tablet, Take 10 mg by mouth 3 (three) times a day as needed for muscle spasms, Disp: , Rfl:   •  folic acid (FOLVITE) 1 mg tablet, Take 1 mg by mouth, Disp: , Rfl:   •  lamoTRIgine (LaMICtal) 150 MG tablet, Take 150 mg by mouth daily, Disp: , Rfl: 2  •  Levomilnacipran HCl ER (FETZIMA) 40 MG extended release capsule, Take by mouth, Disp: , Rfl:   •  Acetaminophen 500 MG, Take 500 mg by mouth (Patient not taking: Reported on 9/23/2022), Disp: , Rfl:   Allergies   Allergen Reactions   • Diclofenac Rash     Topical caused rash   • Latex Hives, Itching and Rash       Vitals: Blood pressure 108/76, pulse (!) 110, temperature (!) 97 °F (36 1 °C), temperature source Tympanic, resp  rate 16, height 5' 10" (1 778 m), weight 73 4 kg (161 lb 12 8 oz)  , Body mass index is 23 22 kg/m²  Physical Exam   Physical Exam  Constitutional:       General: She is not in acute distress  HENT:      Head: Normocephalic  Mouth/Throat:      Pharynx: No oropharyngeal exudate  Eyes:      General: No scleral icterus  Neck:      Vascular: No JVD  Cardiovascular:      Rate and Rhythm: Normal rate and regular rhythm  Heart sounds: No murmur heard  Pulmonary:      Breath sounds: No wheezing or rhonchi  Abdominal:      Palpations: Abdomen is soft  Tenderness: There is no abdominal tenderness  Musculoskeletal:      Cervical back: Neck supple  Lymphadenopathy:      Cervical: No cervical adenopathy  Skin:     General: Skin is warm and dry  Neurological:      Mental Status: She is alert and oriented to person, place, and time  Psychiatric:         Mood and Affect: Mood normal          Labs: I have personally reviewed pertinent lab results  Lab Results   Component Value Date    WBC 6 86 09/23/2022    HGB 13 7 09/23/2022    HCT 40 1 09/23/2022    MCV 94 09/23/2022     09/23/2022     Lab Results   Component Value Date    CALCIUM 9 3 09/23/2022    K 3 4 (L) 09/23/2022    CO2 27 09/23/2022     09/23/2022    BUN 7 09/23/2022    CREATININE 0 88 09/23/2022     No results found for: IGE  Lab Results   Component Value Date    ALT 28 09/23/2022    AST 15 09/23/2022    ALKPHOS 100 09/23/2022       Imaging and other studies: I have personally reviewed pertinent reports  and I have personally reviewed pertinent films in PACS  CT of the chest performed in November 2019 shows no evidence of pulmonary embolism  Lungs are clear      Pulmonary function testing:  Performed 9/29/2022 and personally interpreted  FEV1/FVC ratio 87%    FEV1 97% predicted  FVC 93% predicted  No response to bronchodilators  TLC 85 % predicted  RV 46 % predicted  DLCO corrected for hemoglobin 77 % predicted  PFTs normal

## 2023-01-04 NOTE — ASSESSMENT & PLAN NOTE
Patient recently underwent screening for alpha 1 antitrypsin deficiency and was told she has "S subtype"  I presume she is a carrier, but need additional confirmatory testing  She will have blood work, including an alpha 1 antitrypsin level and phenotype  She understands that smoking cessation is of utmost importance  If she is able to achieve smoking cessation, she should not develop any problems in the future, particularly if she is a carrier  Even if she has true deficiency, smoking cessation would be the primary focus  Her PFTs were normal and she has no emphysema on imaging

## 2023-01-05 LAB — A1AT SERPL-MCNC: 218 MG/DL (ref 100–188)

## 2024-02-29 ENCOUNTER — HOSPITAL ENCOUNTER (OUTPATIENT)
Dept: CT IMAGING | Facility: HOSPITAL | Age: 29
End: 2024-02-29
Payer: COMMERCIAL

## 2024-02-29 DIAGNOSIS — H93.A2 PULSATILE TINNITUS, LEFT EAR: ICD-10-CM

## 2024-02-29 PROCEDURE — 70496 CT ANGIOGRAPHY HEAD: CPT

## 2024-02-29 RX ADMIN — IOHEXOL 85 ML: 350 INJECTION, SOLUTION INTRAVENOUS at 13:42

## 2024-05-13 ENCOUNTER — HOSPITAL ENCOUNTER (OUTPATIENT)
Dept: RADIOLOGY | Facility: HOSPITAL | Age: 29
Discharge: HOME | End: 2024-05-13
Payer: COMMERCIAL

## 2024-05-13 DIAGNOSIS — M25.562 ACUTE PAIN OF LEFT KNEE: ICD-10-CM

## 2024-05-13 PROCEDURE — 73564 X-RAY EXAM KNEE 4 OR MORE: CPT

## 2024-05-28 NOTE — PROGRESS NOTES
PT Evaluation     Today's date: 2024  Patient name: Jakob Bell  : 1995  MRN: [id number]  Referring provider: Rocío Yu MD  Dx:   Encounter Diagnosis     ICD-10-CM    1. Chronic pain of left knee  M25.562     G89.29       2. Gait abnormality  R26.9                      Assessment  Impairments: abnormal gait, abnormal or restricted ROM, activity intolerance, impaired balance, impaired physical strength, lacks appropriate home exercise program, pain with function and unable to perform ADL    Assessment details: PT notes the patient with decrease TKE of the left knee with decrease strength and stability with demonstration of impaired gait pattern and balance leading to increase pain levels and functional limitations with need for course of skilled therapy for 4 weeks with focus on gait/balance, strengthening, analgesic modalities, and HEP.   Understanding of Dx/Px/POC: good     Prognosis: good    Goals  ST.  Initiate HEP  2.  Decrease pain levels by 25-50%   LT.  Improve gait pattern and balance to normal level  2.  Decrease limitations with standing, walking and stairs  3.  Decrease limitations with ADL and transfers  4.  Full return to recreational activities  5.  DC with HEP    Plan  Patient would benefit from: skilled physical therapy and PT eval    Planned therapy interventions: joint mobilization, manual therapy, neuromuscular re-education, self care, strengthening, stretching, therapeutic exercise, functional ROM exercises, flexibility, gait training, graded exercise, home exercise program and balance    Frequency: 2x week  Duration in weeks: 4  Treatment plan discussed with: patient  Plan details: PT notes review of POC and findings with the patient who is in agreement with PT recommendations of course of skilled therapy.         Subjective Evaluation    History of Present Illness  Mechanism of injury: Patient reports development of left knee pain about 1 month ago with no  NIRANJAN.  Patient reports the knee feels stiff with increase pain levels and limitations with squatting, kneeling, lifting, stairs and walking.  Patient went to PCP for evaluation and spoke with MD about the left knee.  Patient was evaluated by x-ray with normal findings so referred to OPPT for further evaluation and treatment of the left knee pain.  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    Patient Goals  Patient goals for therapy: decreased pain, increased motion, increased strength and independence with ADLs/IADLs    Pain  Current pain ratin  At best pain ratin  At worst pain rating: 10  Location: Left knee  Quality: sharp, dull ache and discomfort  Relieving factors: heat and medications  Aggravating factors: stair climbing, lifting and walking      Diagnostic Tests  X-ray: normal  Treatments  Current treatment: physical therapy        Objective     Palpation   Left   Tenderness of the vastus lateralis and vastus medialis.     Tenderness   Left Knee   Tenderness in the lateral joint line, LCL (distal), LCL (proximal), MCL (distal), MCL (proximal) and quadriceps tendon. No tenderness in the medial joint line and patellar tendon.     Neurological Testing     Reflexes   Left   Patellar (L4): normal (2+)  Achilles (S1): normal (2+)    Right   Patellar (L4): normal (2+)  Achilles (S1): normal (2+)    Active Range of Motion   Left Hip   Normal active range of motion    Right Hip   Normal active range of motion  Left Knee   Flexion: WFL  Extension: -6 degrees with pain  Left Ankle/Foot   Normal active range of motion    Right Ankle/Foot   Normal active range of motion    Passive Range of Motion   Left Knee   Normal passive range of motion    Mobility   Patellar Mobility:   Left Knee   Hypermobile: left medial, left lateral, left superior and left inferior      Strength/Myotome Testing     Left Hip   Planes of Motion   Flexion: 4  Extension: 4+  Abduction:  4+  Adduction: 5  External rotation: 4-  Internal rotation: 4    Right Hip   Normal muscle strength    Left Knee   Flexion: 4+  Extension: 4  Quadriceps contraction: fair    Right Knee   Normal strength  Quadriceps contraction: good    Left Ankle/Foot   Normal strength    Right Ankle/Foot   Normal strength    Tests     Left Hip   Negative KAMRYN and FADIR.     Left Knee   Negative anterior Lachman, lateral Blane, medial Blane, posterior Lachman, valgus stress test at 0 degrees and varus stress test at 0 degrees.     Swelling     Left Knee Girth Measurement (cm)   Joint line: 39 cm    Right Knee Girth Measurement (cm)   Joint line: 39 cm    Ambulation     Observational Gait   Gait: antalgic   Walking speed and stride length within functional limits. Decreased left stance time.     Additional Observational Gait Details  PT notes decrease TKE and stance on the left LE              Precautions:  PMH Afib and hypermobility syndrome   POC 6/29/24      Manuals 5/29       Left TKE stretch with left knee PA mobs                                 Neuro Re-Ed        BOSU March         BOSU 3 way SLR         BOSU Squat- Round Down         TB TKE         F/L Lunge         Push/pull cart         Side step and squat         Ther Ex        SRC for ROM and strength         Quad set         Bridge with ABD         Front and retro step over        Stair S/L HR/TR                         HEP update/review  15 min        Ther Activity                        Gait Training                        Modalities        CP PRN

## 2024-05-29 ENCOUNTER — EVALUATION (OUTPATIENT)
Dept: PHYSICAL THERAPY | Facility: CLINIC | Age: 29
End: 2024-05-29
Payer: COMMERCIAL

## 2024-05-29 DIAGNOSIS — M25.562 CHRONIC PAIN OF LEFT KNEE: Primary | ICD-10-CM

## 2024-05-29 DIAGNOSIS — R26.9 GAIT ABNORMALITY: ICD-10-CM

## 2024-05-29 DIAGNOSIS — G89.29 CHRONIC PAIN OF LEFT KNEE: Primary | ICD-10-CM

## 2024-05-29 PROCEDURE — 97161 PT EVAL LOW COMPLEX 20 MIN: CPT | Performed by: PHYSICAL THERAPIST

## 2024-05-29 PROCEDURE — 97535 SELF CARE MNGMENT TRAINING: CPT | Performed by: PHYSICAL THERAPIST

## 2024-05-29 NOTE — LETTER
May 29, 2024    Rocío Yu MD  [address] [facility] [address]    Patient: Jakob Bell   YOB: 1995   Date of Visit: 2024     Encounter Diagnosis     ICD-10-CM    1. Chronic pain of left knee  M25.562     G89.29       2. Gait abnormality  R26.9           Dear Dr. Yu:    Thank you for your recent referral of Jakob Bell. Please review the attached evaluation summary from Jakob's recent visit.     Please verify that you agree with the plan of care by signing the attached order.     If you have any questions or concerns, please do not hesitate to call.     I sincerely appreciate the opportunity to share in the care of one of your patients and hope to have another opportunity to work with you in the near future.       Sincerely,    Eric Fregoso, PT      Referring Provider:      I certify that I have read the below Plan of Care and certify the need for these services furnished under this plan of treatment while under my care.                    Rocío Yu MD  [address] [facility] [address]  Via Fax: [phone]          PT Evaluation     Today's date: 2024  Patient name: Jakob Bell  : 1995  MRN: [id number]  Referring provider: Rocío Yu MD  Dx:   Encounter Diagnosis     ICD-10-CM    1. Chronic pain of left knee  M25.562     G89.29       2. Gait abnormality  R26.9                      Assessment  Impairments: abnormal gait, abnormal or restricted ROM, activity intolerance, impaired balance, impaired physical strength, lacks appropriate home exercise program, pain with function and unable to perform ADL    Assessment details: PT notes the patient with decrease TKE of the left knee with decrease strength and stability with demonstration of impaired gait pattern and balance leading to increase pain levels and functional limitations with need for course of skilled therapy for 4 weeks with focus on gait/balance, strengthening,  analgesic modalities, and HEP.   Understanding of Dx/Px/POC: good     Prognosis: good    Goals  ST.  Initiate HEP  2.  Decrease pain levels by 25-50%   LT.  Improve gait pattern and balance to normal level  2.  Decrease limitations with standing, walking and stairs  3.  Decrease limitations with ADL and transfers  4.  Full return to recreational activities  5.  DC with HEP    Plan  Patient would benefit from: skilled physical therapy and PT eval    Planned therapy interventions: joint mobilization, manual therapy, neuromuscular re-education, self care, strengthening, stretching, therapeutic exercise, functional ROM exercises, flexibility, gait training, graded exercise, home exercise program and balance    Frequency: 2x week  Duration in weeks: 4  Treatment plan discussed with: patient  Plan details: PT notes review of POC and findings with the patient who is in agreement with PT recommendations of course of skilled therapy.         Subjective Evaluation    History of Present Illness  Mechanism of injury: Patient reports development of left knee pain about 1 month ago with no NIRANJAN.  Patient reports the knee feels stiff with increase pain levels and limitations with squatting, kneeling, lifting, stairs and walking.  Patient went to PCP for evaluation and spoke with MD about the left knee.  Patient was evaluated by x-ray with normal findings so referred to OPPT for further evaluation and treatment of the left knee pain.  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    Patient Goals  Patient goals for therapy: decreased pain, increased motion, increased strength and independence with ADLs/IADLs    Pain  Current pain ratin  At best pain ratin  At worst pain rating: 10  Location: Left knee  Quality: sharp, dull ache and discomfort  Relieving factors: heat and medications  Aggravating factors: stair climbing, lifting and walking      Diagnostic Tests  X-ray:  normal  Treatments  Current treatment: physical therapy        Objective     Palpation   Left   Tenderness of the vastus lateralis and vastus medialis.     Tenderness   Left Knee   Tenderness in the lateral joint line, LCL (distal), LCL (proximal), MCL (distal), MCL (proximal) and quadriceps tendon. No tenderness in the medial joint line and patellar tendon.     Neurological Testing     Reflexes   Left   Patellar (L4): normal (2+)  Achilles (S1): normal (2+)    Right   Patellar (L4): normal (2+)  Achilles (S1): normal (2+)    Active Range of Motion   Left Hip   Normal active range of motion    Right Hip   Normal active range of motion  Left Knee   Flexion: WFL  Extension: -6 degrees with pain  Left Ankle/Foot   Normal active range of motion    Right Ankle/Foot   Normal active range of motion    Passive Range of Motion   Left Knee   Normal passive range of motion    Mobility   Patellar Mobility:   Left Knee   Hypermobile: left medial, left lateral, left superior and left inferior      Strength/Myotome Testing     Left Hip   Planes of Motion   Flexion: 4  Extension: 4+  Abduction: 4+  Adduction: 5  External rotation: 4-  Internal rotation: 4    Right Hip   Normal muscle strength    Left Knee   Flexion: 4+  Extension: 4  Quadriceps contraction: fair    Right Knee   Normal strength  Quadriceps contraction: good    Left Ankle/Foot   Normal strength    Right Ankle/Foot   Normal strength    Tests     Left Hip   Negative KAMRYN and FADIR.     Left Knee   Negative anterior Lachman, lateral Blane, medial Blane, posterior Lachman, valgus stress test at 0 degrees and varus stress test at 0 degrees.     Swelling     Left Knee Girth Measurement (cm)   Joint line: 39 cm    Right Knee Girth Measurement (cm)   Joint line: 39 cm    Ambulation     Observational Gait   Gait: antalgic   Walking speed and stride length within functional limits. Decreased left stance time.     Additional Observational Gait Details  PT notes decrease  TKE and stance on the left LE              Precautions:  PMH Afib and hypermobility syndrome   POC 6/29/24      Manuals 5/29       Left TKE stretch with left knee PA mobs                                 Neuro Re-Ed        BOSU March BOSU 3 way SLR         BOSU Squat- Round Down         TB TKE         F/L Lunge         Push/pull cart         Side step and squat         Ther Ex        SRC for ROM and strength         Quad set         Bridge with ABD         Front and retro step over        Stair S/L HR/TR                         HEP update/review  15 min        Ther Activity                        Gait Training                        Modalities        CP PRN

## 2024-05-30 NOTE — PROGRESS NOTES
Daily Note     Today's date: 2024  Patient name: Jakob Bell  : 1995  MRN: [id number]  Referring provider: Rocío Yu MD  Dx:   Encounter Diagnosis     ICD-10-CM    1. Gait abnormality  R26.9       2. Chronic pain of left knee  M25.562     G89.29                      Subjective: Pt reports compliance with HEP      Objective: See treatment diary below      Assessment:  Pt tolerated treatment session fairly well. Responds well to exercises with hip hinge bias in order to load tendon appropriately. Pt would benefit from continued OP PT services.       Plan: Continue per plan of care.      Precautions:  PMH Afib and hypermobility syndrome   POC 24      Manuals       Left TKE stretch with left knee PA mobs   10'                              Neuro Re-Ed        BOSU March   3'      BOSU 3 way SLR   10x bilat ea      BOSU Squat- Round Down   2x10      TB TKE         F/L Lunge         Push/pull cart         Side step and squat   6# 2x30'      Walking lunge  6# 2x30'      Ther Ex        SRC for ROM and strength   10' L2      Quad set         Bridge with ABD   GTB 2x10      Clamshells  GTB 2x10      Front and retro step over        Stair S/L HR/TR   2x10 LLE                      HEP update/review  15 min        Ther Activity                        Gait Training                        Modalities        CP PRN

## 2024-05-31 ENCOUNTER — OFFICE VISIT (OUTPATIENT)
Dept: PHYSICAL THERAPY | Facility: CLINIC | Age: 29
End: 2024-05-31
Payer: COMMERCIAL

## 2024-05-31 DIAGNOSIS — R26.9 GAIT ABNORMALITY: Primary | ICD-10-CM

## 2024-05-31 DIAGNOSIS — M25.562 CHRONIC PAIN OF LEFT KNEE: ICD-10-CM

## 2024-05-31 DIAGNOSIS — G89.29 CHRONIC PAIN OF LEFT KNEE: ICD-10-CM

## 2024-05-31 PROCEDURE — 97140 MANUAL THERAPY 1/> REGIONS: CPT

## 2024-05-31 PROCEDURE — 97110 THERAPEUTIC EXERCISES: CPT

## 2024-05-31 PROCEDURE — 97112 NEUROMUSCULAR REEDUCATION: CPT

## 2024-06-02 NOTE — PROGRESS NOTES
"Daily Note     Today's date: 6/3/2024  Patient name: Jakob Bell  : 1995  MRN: [id number]  Referring provider: Rocío Yu MD  Dx:   Encounter Diagnosis     ICD-10-CM    1. Gait abnormality  R26.9       2. Chronic pain of left knee  M25.562     G89.29                      Subjective: The patient states that she felt okay after her last session.  Reports pain is 7/10 at start of session today.        Objective: See treatment diary below      Assessment:  Pt tolerated treatment session well with no increase in symptoms noted during session.  Weakness noted with TE, would benefit from program of progressive strengthening to help improve function.  Pain level remains the same at end of session.           Plan: Continue per plan of care.   Progress with strengthening as able in upcoming visits.       Precautions:  PMH Afib and hypermobility syndrome   POC 24      Manuals 5/29 5/31 6/3     Left TKE stretch with left knee PA mobs   10' 15'  Hams & Calf stretch also                             Neuro Re-Ed        BOSU March   3' 3'     BOSU 3 way SLR   10x bilat ea 10x ea Javier     BOSU Squat- Round Down   2x10 2x10     TB TKE         F/L Lunge         Push/pull cart         Side step and squat   6# 2x30' 6# 2x30'     Walking lunge  6# 2x30' 6# 2x30'     Ther Ex        SRC for ROM and strength   10' L2 L2 10'     Quad set    5\" 2x10 towel under ankle     Bridge with ABD   GTB 2x10 GTB 2x10     Clamshells  GTB 2x10 GTB 2x10     Front and retro step over        Stair S/L HR/TR   2x10 LLE 2x10 LLE                     HEP update/review  15 min        Ther Activity                        Gait Training                        Modalities        CP PRN   Declined                    "

## 2024-06-03 ENCOUNTER — OFFICE VISIT (OUTPATIENT)
Dept: PHYSICAL THERAPY | Facility: CLINIC | Age: 29
End: 2024-06-03
Payer: COMMERCIAL

## 2024-06-03 DIAGNOSIS — M25.562 CHRONIC PAIN OF LEFT KNEE: ICD-10-CM

## 2024-06-03 DIAGNOSIS — R26.9 GAIT ABNORMALITY: Primary | ICD-10-CM

## 2024-06-03 DIAGNOSIS — G89.29 CHRONIC PAIN OF LEFT KNEE: ICD-10-CM

## 2024-06-03 PROCEDURE — 97140 MANUAL THERAPY 1/> REGIONS: CPT | Performed by: PHYSICAL THERAPIST

## 2024-06-03 PROCEDURE — 97110 THERAPEUTIC EXERCISES: CPT | Performed by: PHYSICAL THERAPIST

## 2024-06-03 PROCEDURE — 97112 NEUROMUSCULAR REEDUCATION: CPT | Performed by: PHYSICAL THERAPIST

## 2024-06-06 NOTE — PROGRESS NOTES
"Daily Note     Today's date: 2024  Patient name: Jakob Bell  : 1995  MRN: [id number]  Referring provider: Rocío Yu MD  Dx:   Encounter Diagnosis     ICD-10-CM    1. Gait abnormality  R26.9       2. Chronic pain of left knee  M25.562     G89.29           Start Time: 1000  Stop Time: 1055  Total time in clinic (min): 55 minutes    Subjective: Patient reports that she is doing well today.  \"I am a little sore from cleaning the other day.\"      Objective: See treatment diary below      Assessment: Tolerated treatment well. Patient exhibited good technique with therapeutic exercises and would benefit from continued PT to increase L knee ROM/strength and endurance to improve functional mobility and gait.      Plan: Continue per plan of care.      Precautions:  PMH Afib and hypermobility syndrome   POC 24      Manuals 5/29 5/31 6/3 6/7    Left TKE stretch with left knee PA mobs   10' 15'  Hams & Calf stretch also 15' c stretching of HS, gastroc, piriformis                            Neuro Re-Ed        BOSU March   3' 3' 3'    BOSU 3 way SLR   10x bilat ea 10x ea Javier 10xea bilat    BOSU Squat- Round Down   2x10 2x10 2x10    TB TKE         F/L Lunge         Push/pull cart         Side step and squat   6# 2x30' 6# 2x30' 6# 2x30'    Walking lunge  6# 2x30' 6# 2x30' 6# 2x30'    Ther Ex        SRC for ROM and strength   10' L2 L2 10' 10'L2    Quad set    5\" 2x10 towel under ankle 2x10 5\"hold RFB under   L ankle    Bridge with ABD   GTB 2x10 GTB 2x10 GTB 2x10    Clamshells  GTB 2x10 GTB 2x10 GTB 2x10    Front and retro step over        Stair S/L HR/TR   2x10 LLE 2x10 LLE [id number] 3\"hold                    HEP update/review  15 min        Ther Activity                        Gait Training                        Modalities    /    CP PRN   Declined                      "

## 2024-06-07 ENCOUNTER — OFFICE VISIT (OUTPATIENT)
Dept: PHYSICAL THERAPY | Facility: CLINIC | Age: 29
End: 2024-06-07
Payer: COMMERCIAL

## 2024-06-07 DIAGNOSIS — R26.9 GAIT ABNORMALITY: Primary | ICD-10-CM

## 2024-06-07 DIAGNOSIS — M25.562 CHRONIC PAIN OF LEFT KNEE: ICD-10-CM

## 2024-06-07 DIAGNOSIS — G89.29 CHRONIC PAIN OF LEFT KNEE: ICD-10-CM

## 2024-06-07 PROCEDURE — 97112 NEUROMUSCULAR REEDUCATION: CPT

## 2024-06-07 PROCEDURE — 97140 MANUAL THERAPY 1/> REGIONS: CPT

## 2024-06-07 PROCEDURE — 97110 THERAPEUTIC EXERCISES: CPT

## 2024-06-13 NOTE — PROGRESS NOTES
"Daily Note     Today's date: 2024  Patient name: Jakob Bell  : 1995  MRN: [id number]  Referring provider: Rocío Yu MD  Dx:   Encounter Diagnosis     ICD-10-CM    1. Gait abnormality  R26.9       2. Chronic pain of left knee  M25.562     G89.29           Start Time: 0955  Stop Time: 1050  Total time in clinic (min): 55 minutes    Subjective: Patient reports she is having 7/10 pain today but reports she worked yesterday and that makes her knee more sore.       Objective: See treatment diary below      Assessment: Tolerated treatment well.  PT notes continuation of decrease ROM and strength t/o the left knee and LE with demonstration of impaired static and dynamic balance with need for continuation of skilled therapy.        Plan: Continue per plan of care.      Precautions:  PMH Afib and hypermobility syndrome   POC 24      Manuals 5/29 5/31 6/3 6/7 6/14   Left TKE stretch with left knee PA mobs   10' 15'  Hams & Calf stretch also 15' c stretching of HS, gastroc, piriformis 10' c stretching of HS, gastroc, piriformis                           Neuro Re-Ed        BOSU March   3' 3' 3' BOSU S/L Bridge   10x Bilat    BOSU 3 way SLR   10x bilat ea 10x ea Javier 10xea bilat 10x ea bilat   BOSU Squat- Round Down   2x10 2x10 2x10 2x10   TB TKE      Prone Plank   2x5  5\" Hold    F/L Lunge         Push/pull cart         Side step and squat   6# 2x30' 6# 2x30' 6# 2x30' 2x30' 6#WB   Walking lunge  6# 2x30' 6# 2x30' 6# 2x30' 2x30' 6#WB        [place] walk outs fwd/bwd 4x ea 10#   Ther Ex        SRC for ROM and strength   10' L2 L2 10' 10'L2 10' L2   Quad set    5\" 2x10 towel under ankle 2x10 5\"hold RFB under   L ankle 2x10 5\" hold RFB under L ankle   Bridge with ABD   GTB 2x10 GTB 2x10 GTB 2x10 2x10 Blue   Clamshells  GTB 2x10 GTB 2x10 GTB 2x10 HEP    Front and retro step over        Stair S/L HR/TR   2x10 LLE 2x10 LLE [id number] 3\"hold                    HEP update/review  15 min        Ther " Activity    6/7                    Gait Training    6/7                    Modalities    6/7    CP PRN   Declined  declined

## 2024-06-14 ENCOUNTER — OFFICE VISIT (OUTPATIENT)
Dept: PHYSICAL THERAPY | Facility: CLINIC | Age: 29
End: 2024-06-14
Payer: COMMERCIAL

## 2024-06-14 DIAGNOSIS — G89.29 CHRONIC PAIN OF LEFT KNEE: ICD-10-CM

## 2024-06-14 DIAGNOSIS — M25.562 CHRONIC PAIN OF LEFT KNEE: ICD-10-CM

## 2024-06-14 DIAGNOSIS — R26.9 GAIT ABNORMALITY: Primary | ICD-10-CM

## 2024-06-14 PROCEDURE — 97112 NEUROMUSCULAR REEDUCATION: CPT

## 2024-06-14 PROCEDURE — 97110 THERAPEUTIC EXERCISES: CPT

## 2024-06-20 ENCOUNTER — HOSPITAL ENCOUNTER (OUTPATIENT)
Dept: MRI IMAGING | Facility: HOSPITAL | Age: 29
End: 2024-06-20
Payer: COMMERCIAL

## 2024-06-20 DIAGNOSIS — H93.A2 PULSATILE TINNITUS, LEFT EAR: ICD-10-CM

## 2024-06-20 PROCEDURE — A9585 GADOBUTROL INJECTION: HCPCS | Performed by: RADIOLOGY

## 2024-06-20 PROCEDURE — 70553 MRI BRAIN STEM W/O & W/DYE: CPT

## 2024-06-20 RX ORDER — GADOBUTROL 604.72 MG/ML
7.5 INJECTION INTRAVENOUS
Status: COMPLETED | OUTPATIENT
Start: 2024-06-20 | End: 2024-06-20

## 2024-06-20 RX ADMIN — GADOBUTROL 7.5 ML: 604.72 INJECTION INTRAVENOUS at 15:00

## 2024-12-11 ENCOUNTER — TELEPHONE (OUTPATIENT)
Dept: OBGYN CLINIC | Facility: CLINIC | Age: 29
End: 2024-12-11

## 2024-12-11 ENCOUNTER — OFFICE VISIT (OUTPATIENT)
Dept: OBGYN CLINIC | Facility: CLINIC | Age: 29
End: 2024-12-11
Payer: COMMERCIAL

## 2024-12-11 VITALS — HEIGHT: 69 IN | SYSTOLIC BLOOD PRESSURE: 98 MMHG | DIASTOLIC BLOOD PRESSURE: 56 MMHG | BODY MASS INDEX: 23.89 KG/M2

## 2024-12-11 DIAGNOSIS — Z30.09 STERILIZATION CONSULT: Primary | ICD-10-CM

## 2024-12-11 PROCEDURE — 99204 OFFICE O/P NEW MOD 45 MIN: CPT | Performed by: OBSTETRICS & GYNECOLOGY

## 2024-12-11 RX ORDER — (CALCIUM, MAGNESIUM, POTASSIUM, AND SODIUM OXYBATES) .5; .5; .5; .5 G/ML; G/ML; G/ML; G/ML
SOLUTION ORAL
COMMUNITY
Start: 2024-11-21

## 2024-12-11 NOTE — H&P (VIEW-ONLY)
Subjective    Patient is a 28 yo  who presents today to discuss permanent sterilization. Her obstetric history is notable for 1 prior term  section in 2011. Her medical history is notable for alpha-1 antitrypsin deficiency, tobacco use, and chronic knee pain. She is currently taking COCs for birth control, but is sure she doesn't want any more children.    Patient understands that tubal ligation is intended for permanent sterilization and is not intended to be a reversible form of birth control.  Patient understands that though this procedure is intended to provide permanent sterilization, there is still a chance for failure of the procedure and that she may become pregnant in the future despite a tubal ligation  She understands that with tubal ligation there exists an increased risk of ectopic pregnancy were she to become pregnant postprocedure, and that this is considered an abnormal pregnancy and would likely result in miscarriage or termination, and that ectopic pregnancy poses a risk to her maternal health and well-being including a risk of death.  Patient understands that there are alternative forms of birth control including abstinence, condoms or other barrier methods, OCPs, depo provera injection, ring, patch, Nexplanon, IUD - all of which are reversible and would allow for attempt of future pregnancy.  She has declined these methods of birth control.  Patient also declines vasectomy of partner as a method of birth control.       Discussed risks of surgical procedures, including risk of bleeding, blood clots, infection, injury to soft tissue or surrounding organs, permanent disability, or even death. Discussed procedure in detail. Discussed the various methods of surgical sterilization including tubal ligation and salpingectomy, and that route of procedure will be dependent on the physician covering the procedure. Patient demonstrated understanding and expressed a desire for salpingectomy as  "the preferred method. Patient had opportunity to ask questions and all questions were answered to patient's satisfaction. MA-31 form not required.     OB History          1    Para   1    Term   1            AB        Living   1         SAB        IAB        Ectopic        Multiple        Live Births   1           Obstetric Comments   C- section                      Objective    Vitals:    24 0903   BP: 98/56   BP Location: Left arm   Patient Position: Sitting   Cuff Size: Standard   Height: 5' 9\" (1.753 m)        Physical Exam  Constitutional:       Appearance: She is well-developed.   Cardiovascular:      Rate and Rhythm: Normal rate.   Pulmonary:      Effort: Pulmonary effort is normal. No respiratory distress.   Abdominal:      Palpations: Abdomen is soft.      Tenderness: There is no abdominal tenderness.   Musculoskeletal:         General: No deformity.   Skin:     General: Skin is warm and dry.          Assessment    Patient Active Problem List   Diagnosis    Chronic pain of right knee    Patella kyle    Tachycardia, unspecified    Alpha-1-antitrypsin deficiency (HCC)    Cigarette nicotine dependence without complication        Plan  - Up to date on pap ([facility] 2024)  - Consent signed today for exam under anesthesia, laparoscopic bilateral salpingectomy, all other indicated procedures; MA-31 not required  - Discussed pain control after would be motrin/tylenol and roxicodone as needed; patient will not take her tramadol while taking roxicodone  "

## 2024-12-11 NOTE — TELEPHONE ENCOUNTER
----- Message from Oksana Sharpe MD sent at 2024  9:31 AM EST -----  Regarding: tubal 1/2 PM  [facility] GYN Department  Surgery Scheduling Sheet    Patient Name: Jakob Bell  : 1995    Provider: Oksana Sharpe MD     Needed: no; Language: N/A    Procedure: exam under anesthesia and bilateral salpingectomy    Diagnosis: sterilization    Special Needs or Equipment: none    Anesthesia: General anesthesia    Length of stay: outpatient  Does patient have comorbid conditions that will require close perioperative monitoring prior to safe discharge: no    The patient has comorbid conditions that will require close perioperative monitoring prior to safe discharge, including N/A.   This may require acute care beyond the usual and routine recovery period. As such, inpatient admission post-operatively is expected and appropriate, and anticipated hospital length of stay will be >2 midnights.    Pre-Admission Testing Needed: no   Labs that should be ordered: urine pregnancy test    Order PAT that is recommended in prep for procedure?: Not Indicated    Medical Clearance Needed: no; Provider: N/A    MA Form Signed (tubals/hysterectomy): Not Indicated    Surgical Drink Given: no     How many days out of work: 2 week(s)     How many days no drivin day(s)       Is pre op appt needed?  no  Interval for post op appt: 2 week(s)       For Surgical Scheduler:     Surgery Scheduled On:  [place]: [facility]    Pre-op Appt:   Post op Appt:  Consult/Medical clearance appt:   ##please schedule for 1/2 PM##

## 2024-12-11 NOTE — PROGRESS NOTES
Subjective    Patient is a 28 yo  who presents today to discuss permanent sterilization. Her obstetric history is notable for 1 prior term  section in 2011. Her medical history is notable for alpha-1 antitrypsin deficiency, tobacco use, and chronic knee pain. She is currently taking COCs for birth control, but is sure she doesn't want any more children.    Patient understands that tubal ligation is intended for permanent sterilization and is not intended to be a reversible form of birth control.  Patient understands that though this procedure is intended to provide permanent sterilization, there is still a chance for failure of the procedure and that she may become pregnant in the future despite a tubal ligation  She understands that with tubal ligation there exists an increased risk of ectopic pregnancy were she to become pregnant postprocedure, and that this is considered an abnormal pregnancy and would likely result in miscarriage or termination, and that ectopic pregnancy poses a risk to her maternal health and well-being including a risk of death.  Patient understands that there are alternative forms of birth control including abstinence, condoms or other barrier methods, OCPs, depo provera injection, ring, patch, Nexplanon, IUD - all of which are reversible and would allow for attempt of future pregnancy.  She has declined these methods of birth control.  Patient also declines vasectomy of partner as a method of birth control.       Discussed risks of surgical procedures, including risk of bleeding, blood clots, infection, injury to soft tissue or surrounding organs, permanent disability, or even death. Discussed procedure in detail. Discussed the various methods of surgical sterilization including tubal ligation and salpingectomy, and that route of procedure will be dependent on the physician covering the procedure. Patient demonstrated understanding and expressed a desire for salpingectomy as  "the preferred method. Patient had opportunity to ask questions and all questions were answered to patient's satisfaction. MA-31 form not required.     OB History          1    Para   1    Term   1            AB        Living   1         SAB        IAB        Ectopic        Multiple        Live Births   1           Obstetric Comments   C- section                      Objective    Vitals:    24 0903   BP: 98/56   BP Location: Left arm   Patient Position: Sitting   Cuff Size: Standard   Height: 5' 9\" (1.753 m)        Physical Exam  Constitutional:       Appearance: She is well-developed.   Cardiovascular:      Rate and Rhythm: Normal rate.   Pulmonary:      Effort: Pulmonary effort is normal. No respiratory distress.   Abdominal:      Palpations: Abdomen is soft.      Tenderness: There is no abdominal tenderness.   Musculoskeletal:         General: No deformity.   Skin:     General: Skin is warm and dry.          Assessment    Patient Active Problem List   Diagnosis    Chronic pain of right knee    Patella kyle    Tachycardia, unspecified    Alpha-1-antitrypsin deficiency (HCC)    Cigarette nicotine dependence without complication        Plan  - Up to date on pap ([facility] 2024)  - Consent signed today for exam under anesthesia, laparoscopic bilateral salpingectomy, all other indicated procedures; MA-31 not required  - Discussed pain control after would be motrin/tylenol and roxicodone as needed; patient will not take her tramadol while taking roxicodone  "

## 2024-12-17 ENCOUNTER — ANESTHESIA EVENT (OUTPATIENT)
Dept: PERIOP | Facility: HOSPITAL | Age: 29
End: 2024-12-17
Payer: COMMERCIAL

## 2024-12-26 NOTE — PRE-PROCEDURE INSTRUCTIONS
Pre-Surgery Instructions:   Medication Instructions    albuterol (2.5 mg/3 mL) 0.083 % nebulizer solution Uses PRN- OK to take day of surgery    albuterol (PROVENTIL HFA,VENTOLIN HFA) 90 mcg/act inhaler Uses PRN- OK to take day of surgery    Apri 0.15-30 MG-MCG per tablet Take day of surgery.    cyclobenzaprine (FLEXERIL) 10 mg tablet Uses PRN- OK to take day of surgery    folic acid (FOLVITE) 1 mg tablet Hold day of surgery.    lamoTRIgine (LaMICtal) 150 MG tablet Take day of surgery.    Levomilnacipran HCl ER (FETZIMA) 40 MG extended release capsule Take day of surgery.    traMADol (ULTRAM) 50 mg tablet Uses PRN- OK to take day of surgery    Xywav 500 MG/ML SOLN Take night before surgery     Spoke with pt via phone.    Medication instructions for day surgery reviewed. Please use only a sip of water to take your instructed medications. Avoid all over the counter vitamins, supplements and NSAIDS for one week prior to surgery per anesthesia guidelines. Tylenol is ok to take as needed.     You will receive a call one business day prior to surgery with an arrival time and hospital directions. If your surgery is scheduled on a Monday, the hospital will be calling you on the Friday prior to your surgery. If you have not heard from anyone by 8pm, please call the hospital supervisor through the hospital  at [phone]. ([place] [phone] or [place] [phone]).    Do not eat or drink anything after midnight the night before your surgery, including candy, mints, lifesavers, or chewing gum. Do not drink alcohol 24hrs before your surgery. Try not to smoke at least 24hrs before your surgery.       Follow the pre surgery showering instructions as listed in the “My Surgical Experience Booklet” or otherwise provided by your surgeon's office. Do not use a blade to shave the surgical area 1 week before surgery. It is okay to use a clean electric clippers up to 24 hours before surgery. Do not apply any lotions,  creams, including makeup, cologne, deodorant, or perfumes after showering on the day of your surgery. Do not use dry shampoo, hair spray, hair gel, or any type of hair products.     No contact lenses, eye make-up, or artificial eyelashes. Remove nail polish, including gel polish, and any artificial, gel, or acrylic nails if possible. Remove all jewelry including rings and body piercing jewelry.     Wear causal clothing that is easy to take on and off. Consider your type of surgery.    Keep any valuables, jewelry, piercings at home. Please bring any specially ordered equipment (sling, braces) if indicated.    Arrange for a responsible person to drive you to and from the hospital on the day of your surgery. Please confirm the visitor policy for the day of your procedure when you receive your phone call with an arrival time.     Call the surgeon's office with any new illnesses, exposures, or additional questions prior to surgery.    Please reference your “My Surgical Experience Booklet” for additional information to prepare for your upcoming surgery.

## 2025-01-02 ENCOUNTER — HOSPITAL ENCOUNTER (OUTPATIENT)
Facility: HOSPITAL | Age: 30
Setting detail: OUTPATIENT SURGERY
Discharge: HOME | End: 2025-01-02
Attending: OBSTETRICS & GYNECOLOGY | Admitting: OBSTETRICS & GYNECOLOGY
Payer: COMMERCIAL

## 2025-01-02 ENCOUNTER — ANESTHESIA (OUTPATIENT)
Dept: PERIOP | Facility: HOSPITAL | Age: 30
End: 2025-01-02
Payer: COMMERCIAL

## 2025-01-02 VITALS
WEIGHT: 160.72 LBS | OXYGEN SATURATION: 100 % | DIASTOLIC BLOOD PRESSURE: 70 MMHG | HEART RATE: 98 BPM | SYSTOLIC BLOOD PRESSURE: 104 MMHG | HEIGHT: 70 IN | BODY MASS INDEX: 23.01 KG/M2 | TEMPERATURE: 97.8 F | RESPIRATION RATE: 16 BRPM

## 2025-01-02 DIAGNOSIS — Z30.09 STERILIZATION CONSULT: ICD-10-CM

## 2025-01-02 DIAGNOSIS — Z90.79 STATUS POST BILATERAL SALPINGECTOMY: Primary | ICD-10-CM

## 2025-01-02 PROBLEM — F17.200 SMOKING: Status: ACTIVE | Noted: 2025-01-02

## 2025-01-02 PROCEDURE — 88302 TISSUE EXAM BY PATHOLOGIST: CPT | Performed by: STUDENT IN AN ORGANIZED HEALTH CARE EDUCATION/TRAINING PROGRAM

## 2025-01-02 PROCEDURE — 58661 LAPAROSCOPY REMOVE ADNEXA: CPT | Performed by: OBSTETRICS & GYNECOLOGY

## 2025-01-02 RX ORDER — SODIUM CHLORIDE, SODIUM LACTATE, POTASSIUM CHLORIDE, CALCIUM CHLORIDE 600; 310; 30; 20 MG/100ML; MG/100ML; MG/100ML; MG/100ML
INJECTION, SOLUTION INTRAVENOUS CONTINUOUS PRN
Status: DISCONTINUED | OUTPATIENT
Start: 2025-01-02 | End: 2025-01-02

## 2025-01-02 RX ORDER — IBUPROFEN 600 MG/1
600 TABLET, FILM COATED ORAL EVERY 6 HOURS PRN
Qty: 30 TABLET | Refills: 0 | Status: SHIPPED | OUTPATIENT
Start: 2025-01-02

## 2025-01-02 RX ORDER — MAGNESIUM HYDROXIDE 1200 MG/15ML
LIQUID ORAL AS NEEDED
Status: DISCONTINUED | OUTPATIENT
Start: 2025-01-02 | End: 2025-01-02 | Stop reason: HOSPADM

## 2025-01-02 RX ORDER — ONDANSETRON 2 MG/ML
INJECTION INTRAMUSCULAR; INTRAVENOUS AS NEEDED
Status: DISCONTINUED | OUTPATIENT
Start: 2025-01-02 | End: 2025-01-02

## 2025-01-02 RX ORDER — BUPIVACAINE HYDROCHLORIDE 2.5 MG/ML
INJECTION, SOLUTION EPIDURAL; INFILTRATION; INTRACAUDAL AS NEEDED
Status: DISCONTINUED | OUTPATIENT
Start: 2025-01-02 | End: 2025-01-02 | Stop reason: HOSPADM

## 2025-01-02 RX ORDER — KETOROLAC TROMETHAMINE 30 MG/ML
INJECTION, SOLUTION INTRAMUSCULAR; INTRAVENOUS AS NEEDED
Status: DISCONTINUED | OUTPATIENT
Start: 2025-01-02 | End: 2025-01-02

## 2025-01-02 RX ORDER — DEXAMETHASONE SODIUM PHOSPHATE 10 MG/ML
INJECTION, SOLUTION INTRAMUSCULAR; INTRAVENOUS AS NEEDED
Status: DISCONTINUED | OUTPATIENT
Start: 2025-01-02 | End: 2025-01-02

## 2025-01-02 RX ORDER — PROMETHAZINE HYDROCHLORIDE 25 MG/ML
12.5 INJECTION, SOLUTION INTRAMUSCULAR; INTRAVENOUS ONCE AS NEEDED
Status: DISCONTINUED | OUTPATIENT
Start: 2025-01-02 | End: 2025-01-02 | Stop reason: HOSPADM

## 2025-01-02 RX ORDER — LIDOCAINE HYDROCHLORIDE 10 MG/ML
INJECTION, SOLUTION EPIDURAL; INFILTRATION; INTRACAUDAL; PERINEURAL AS NEEDED
Status: DISCONTINUED | OUTPATIENT
Start: 2025-01-02 | End: 2025-01-02

## 2025-01-02 RX ORDER — ACETAMINOPHEN 325 MG/1
975 TABLET ORAL EVERY 6 HOURS PRN
Status: DISCONTINUED | OUTPATIENT
Start: 2025-01-02 | End: 2025-01-02 | Stop reason: HOSPADM

## 2025-01-02 RX ORDER — ONDANSETRON 2 MG/ML
4 INJECTION INTRAMUSCULAR; INTRAVENOUS ONCE AS NEEDED
Status: DISCONTINUED | OUTPATIENT
Start: 2025-01-02 | End: 2025-01-02 | Stop reason: HOSPADM

## 2025-01-02 RX ORDER — FENTANYL CITRATE/PF 50 MCG/ML
25 SYRINGE (ML) INJECTION
Status: DISCONTINUED | OUTPATIENT
Start: 2025-01-02 | End: 2025-01-02 | Stop reason: HOSPADM

## 2025-01-02 RX ORDER — MIDAZOLAM HYDROCHLORIDE 2 MG/2ML
INJECTION, SOLUTION INTRAMUSCULAR; INTRAVENOUS AS NEEDED
Status: DISCONTINUED | OUTPATIENT
Start: 2025-01-02 | End: 2025-01-02

## 2025-01-02 RX ORDER — SODIUM CHLORIDE 9 MG/ML
125 INJECTION, SOLUTION INTRAVENOUS CONTINUOUS
Status: DISCONTINUED | OUTPATIENT
Start: 2025-01-02 | End: 2025-01-02 | Stop reason: HOSPADM

## 2025-01-02 RX ORDER — FENTANYL CITRATE 50 UG/ML
INJECTION, SOLUTION INTRAMUSCULAR; INTRAVENOUS AS NEEDED
Status: DISCONTINUED | OUTPATIENT
Start: 2025-01-02 | End: 2025-01-02

## 2025-01-02 RX ORDER — ROCURONIUM BROMIDE 10 MG/ML
INJECTION, SOLUTION INTRAVENOUS AS NEEDED
Status: DISCONTINUED | OUTPATIENT
Start: 2025-01-02 | End: 2025-01-02

## 2025-01-02 RX ORDER — ONDANSETRON 2 MG/ML
4 INJECTION INTRAMUSCULAR; INTRAVENOUS EVERY 6 HOURS PRN
Status: DISCONTINUED | OUTPATIENT
Start: 2025-01-02 | End: 2025-01-02 | Stop reason: HOSPADM

## 2025-01-02 RX ORDER — SENNOSIDES 8.6 MG
650 CAPSULE ORAL EVERY 8 HOURS PRN
Qty: 30 TABLET | Refills: 0 | Status: SHIPPED | OUTPATIENT
Start: 2025-01-02

## 2025-01-02 RX ORDER — OXYCODONE HYDROCHLORIDE 5 MG/1
5 TABLET ORAL EVERY 4 HOURS PRN
Qty: 10 TABLET | Refills: 0 | Status: SHIPPED | OUTPATIENT
Start: 2025-01-02 | End: 2025-01-12

## 2025-01-02 RX ORDER — LIDOCAINE HYDROCHLORIDE 20 MG/ML
INJECTION, SOLUTION EPIDURAL; INFILTRATION; INTRACAUDAL; PERINEURAL AS NEEDED
Status: DISCONTINUED | OUTPATIENT
Start: 2025-01-02 | End: 2025-01-02

## 2025-01-02 RX ORDER — PROPOFOL 10 MG/ML
INJECTION, EMULSION INTRAVENOUS AS NEEDED
Status: DISCONTINUED | OUTPATIENT
Start: 2025-01-02 | End: 2025-01-02

## 2025-01-02 RX ADMIN — SUGAMMADEX 200 MG: 100 INJECTION, SOLUTION INTRAVENOUS at 16:42

## 2025-01-02 RX ADMIN — DEXMEDETOMIDINE 4 MCG: 100 INJECTION, SOLUTION INTRAVENOUS at 16:10

## 2025-01-02 RX ADMIN — ONDANSETRON 4 MG: 2 INJECTION INTRAMUSCULAR; INTRAVENOUS at 15:54

## 2025-01-02 RX ADMIN — PROPOFOL 190 MG: 10 INJECTION, EMULSION INTRAVENOUS at 15:44

## 2025-01-02 RX ADMIN — SODIUM CHLORIDE, SODIUM LACTATE, POTASSIUM CHLORIDE, AND CALCIUM CHLORIDE: .6; .31; .03; .02 INJECTION, SOLUTION INTRAVENOUS at 16:26

## 2025-01-02 RX ADMIN — FENTANYL CITRATE 100 MCG: 50 INJECTION INTRAMUSCULAR; INTRAVENOUS at 15:44

## 2025-01-02 RX ADMIN — DEXMEDETOMIDINE 4 MCG: 100 INJECTION, SOLUTION INTRAVENOUS at 16:02

## 2025-01-02 RX ADMIN — MIDAZOLAM 2 MG: 1 INJECTION INTRAMUSCULAR; INTRAVENOUS at 15:43

## 2025-01-02 RX ADMIN — DEXAMETHASONE SODIUM PHOSPHATE 10 MG: 10 INJECTION INTRAMUSCULAR; INTRAVENOUS at 15:54

## 2025-01-02 RX ADMIN — DEXMEDETOMIDINE 4 MCG: 100 INJECTION, SOLUTION INTRAVENOUS at 15:56

## 2025-01-02 RX ADMIN — KETOROLAC TROMETHAMINE 30 MG: 30 INJECTION, SOLUTION INTRAMUSCULAR; INTRAVENOUS at 16:42

## 2025-01-02 RX ADMIN — FENTANYL CITRATE 50 MCG: 50 INJECTION INTRAMUSCULAR; INTRAVENOUS at 16:14

## 2025-01-02 RX ADMIN — LIDOCAINE HYDROCHLORIDE 80 MG: 20 INJECTION, SOLUTION EPIDURAL; INFILTRATION; INTRACAUDAL at 15:44

## 2025-01-02 RX ADMIN — SODIUM CHLORIDE 125 ML/HR: 0.9 INJECTION, SOLUTION INTRAVENOUS at 14:24

## 2025-01-02 RX ADMIN — ROCURONIUM 50 MG: 50 INJECTION, SOLUTION INTRAVENOUS at 15:44

## 2025-01-02 RX ADMIN — FENTANYL CITRATE 50 MCG: 50 INJECTION INTRAMUSCULAR; INTRAVENOUS at 16:09

## 2025-01-02 NOTE — ANESTHESIA PREPROCEDURE EVALUATION
Procedure:  SALPINGECTOMY, LAPAROSCOPIC bilateral AND EXAM UNDER ANESTHESIA (Bilateral: Uterus)    Relevant Problems   ANESTHESIA (within normal limits)      CARDIO (within normal limits)  Left Ventricle Left ventricular cavity size is normal. Wall thickness is normal. The left ventricular ejection fraction is 60%. Systolic function is normal.  Wall motion is normal. Diastolic function is normal.  Right Ventricle Right ventricular cavity size is mildly dilated. Systolic function is normal. Wall thickness is normal.  Left Atrium The atrium is normal in size.  Right Atrium The atrium is normal in size.  Atrial Septum There is no atrial septal defect. No patent foramen ovale detected.  Aortic Valve The aortic valve is trileaflet. The leaflets are not thickened. The leaflets are not calcified. The leaflets exhibit normal mobility. There is no evidence of regurgitation. The aortic valve has no significant stenosis.  Mitral Valve There is no evidence of regurgitation. There is no evidence of stenosis. The mitral valve has normal structure and normal function.  Tricuspid Valve Tricuspid valve structure is normal. There is no evidence of regurgitation. There is no evidence of stenosis.  Pulmonic Valve Pulmonic valve structure is normal. There is no evidence of regurgitation. There is no evidence of stenosis.  Ascending Aorta The aortic root is normal in size.          ENDO (within normal limits)      GI/HEPATIC   (+) Alpha-1-antitrypsin deficiency (HCC)      /RENAL (within normal limits)      MUSCULOSKELETAL (within normal limits)      NEURO/PSYCH (within normal limits)      PULMONARY   (+) Smoking      Behavioral Health   (+) Cigarette nicotine dependence without complication      Orthopedic/Musculoskeletal   (+) Chronic pain of right knee      Other   (+) Tachycardia, unspecified        Physical Exam    Airway    Mallampati score: II         Dental   No notable dental hx     Cardiovascular  Cardiovascular exam  normal    Pulmonary  Pulmonary exam normal Breath sounds clear to auscultation    Other Findings  post-pubertal.      Anesthesia Plan  ASA Score- 2     Anesthesia Type- general with ASA Monitors.         Additional Monitors:     Airway Plan: ETT.           Plan Factors-    Chart reviewed.   Existing labs reviewed. Patient summary reviewed.    Patient is not a current smoker.              Induction- intravenous.    Postoperative Plan-         Informed Consent- Anesthetic plan and risks discussed with patient.

## 2025-01-02 NOTE — ANESTHESIA POSTPROCEDURE EVALUATION
Post-Op Assessment Note    CV Status:  Stable  Pain Score: 0    Pain management: adequate       Mental Status:  Alert and awake   Hydration Status:  Euvolemic   PONV Controlled:  Controlled   Airway Patency:  Patent  Airway: intubated     Post Op Vitals Reviewed: Yes    No anethesia notable event occurred.    Staff: Anesthesiologist           Last Filed PACU Vitals:  Vitals Value Taken Time   Temp 96.9 °F (36.1 °C) 01/02/25 1655   Pulse 102 01/02/25 1726   /62 01/02/25 1714   Resp 15 01/02/25 1726   SpO2 100 % 01/02/25 1726   Vitals shown include unfiled device data.    Modified Gabriele:     Vitals Value Taken Time   Activity 2 01/02/25 1710   Respiration 2 01/02/25 1710   Circulation 2 01/02/25 1710   Consciousness 2 01/02/25 1710   Oxygen Saturation 2 01/02/25 1710     Modified Gabriele Score: 10

## 2025-01-02 NOTE — OP NOTE
OPERATIVE REPORT  PATIENT NAME: Jakob Bell    :  1995  MRN: [id number]  Pt Location: [facility] OR ROOM 04    SURGERY DATE: 2025    Surgeons and Role:     * Oksana Sharpe MD - Primary     * Kelly Porras MD - Assisting    Preop Diagnosis:  Sterilization consult [Z30.09]    Post-Op Diagnosis Codes:     * Sterilization consult [Z30.09]    Procedure(s):  Bilateral - SALPINGECTOMY. LAPAROSCOPIC bilateral AND EXAM UNDER ANESTHESIA    Specimen(s):  ID Type Source Tests Collected by Time Destination   1 : BILATERAL FALLOPIAN TUBES Tissue Fallopian Tubes, Bilateral TISSUE EXAM Oksana Sharpe MD 2025 1640        Estimated Blood Loss:   Minimal    Anesthesia Type:   General endotracheal    Operative Indications:  Sterilization consult [Z30.09]    Operative Findings:  Normal appearing external genitalia  Extensive omental and small bowel adhesions to the anterior abdominal wall  Small adhesions from anterior lower uterine segment to anterior abdominal wall  Possible endometriosis on fallopian tubes and in posterior cul de sac  Uterus, bilateral fallopian tubes and bilateral ovaries otherwise normal      Complications:   None    Procedure and Technique:    All risks, benefits, and alternatives to the procedure were discussed with the patient and she had the opportunity to ask questions.  The risk of regret of procedure was also addressed with the patient and options for LARC was discussed. Patient expressed desire to continue with tubal sterilization. Informed consent was obtained.     Patient was taken to the operating room. General endotracheal anesthesia (GET) was administered and the patient was positioned on the OR table in the dorsal lithotomy position. All pressure points were padded and a antoinette hugger was placed to maintain control of core body temperature. A bimanual exam was performed and the uterus was noted to be midposition, normal in size and consistency with no palpable adnexal masses or  fullness. The patient was prepped and draped in the usual sterile fashion with chloroprep on the abdomen and chlorhexidine prep on the vagina and perineum.    A time out was performed to confirm correct patient and correct procedure. A straight catheter was introduced into the bladder, which was drained of 50 cc of clear yellow urine. Sterile gloves were then exchanged and attention was turned to the abdomen.     The Veress needle was then introduced through the base of the umbilicus to obtain pneumoperitoneum. Pneumoperitoneum was established to 15 mm Hg. A 5 mm incision was made at the inferior edge of the umbilicus for introduction of a 5 mm trocar. Trocar was introduced under direct visualization. The entire abdomen and pelvis was inspected and there was no evidence of injury to bowel, bladder, vasculature, or other structures. Attention was then turned to the pelvis.    Patient was placed in Trendelenburg and the uterus was elevated to visualize the fallopian tubes. There was noted to be grossly normal tubes and ovaries bilaterally. However, extensive omental and small bowel adhesions to the anterior abdominal wall were noted. Two additional port sites were selected in the left and right lower abdomen approximately 2cm superior and medial to the iliac crests.  A 5 mm incision was made for introduction of a 5 mm trocar under direct visualization at each site. The adhesions were then taken down using the laparoscopic scissors and the Enseal device.    A blunt probe was then inserted and used to visualize the fimbriated ends of the tubes. The right fallopian tube was grasped at its fimbriated end with a blunt grasper and elevated to visualize the mesosalpinx. The Enseal device was used to ligate along the mesosalpinx, working proximally and taking care to avoid ovarian vasculature. Approximately 2cm from the cornua, the Enseal was used to amputate fallopian tube. This was then withdrawn from the abdominal cavity  and sent for pathology. Attention was then turned to the contralateral tube, which was amputated in similar fashion. Good hemostasis was confirmed following salpingectomy.    Following salpingectomy, pneumoperitoneum was allowed to escape. Adequate hemostasis was visualized. The inferior trocars were removed under direct visualization. The laparoscope was withdrawn from the abdomen, followed by its trocar sleeve at the umbilicus. Skin incisions were closed with 4-0 Monocryl with Exofin placed over top.    At the conclusion of the procedure, all needle, sponge, and instrument counts were noted to be correct x2. Patient tolerated the procedure well and was transferred to PACU in stable condition prior to discharge with follow up in 1-2 weeks.     I was present for the entire procedure.    Patient Disposition:  PACU              SIGNATURE: Oksana Sharpe MD  DATE: January 2, 2025  TIME: 4:46 PM

## 2025-01-02 NOTE — ANESTHESIA POSTPROCEDURE EVALUATION
Post-Op Assessment Note    CV Status:  Stable  Pain Score: 0    Pain management: adequate       Mental Status:  Alert and awake   Hydration Status:  Euvolemic   PONV Controlled:  Controlled   Airway Patency:  Patent  Airway: intubated     Post Op Vitals Reviewed: Yes    No anethesia notable event occurred.    Staff: CRNA           Last Filed PACU Vitals:  Vitals Value Taken Time   Temp     Pulse 120 01/02/25 1656   /57 01/02/25 1655   Resp 19 01/02/25 1656   SpO2 98 % 01/02/25 1656   Vitals shown include unfiled device data.

## 2025-01-03 ENCOUNTER — TELEPHONE (OUTPATIENT)
Age: 30
End: 2025-01-03

## 2025-01-03 NOTE — TELEPHONE ENCOUNTER
Patient scheduled on my chart for her post op appointment.  Can you please check if it is for enough time at your convenience? Thank you

## 2025-01-06 ENCOUNTER — RESULTS FOLLOW-UP (OUTPATIENT)
Dept: OBGYN CLINIC | Facility: CLINIC | Age: 30
End: 2025-01-06

## 2025-01-06 PROCEDURE — 88302 TISSUE EXAM BY PATHOLOGIST: CPT | Performed by: STUDENT IN AN ORGANIZED HEALTH CARE EDUCATION/TRAINING PROGRAM

## 2025-01-09 ENCOUNTER — OFFICE VISIT (OUTPATIENT)
Dept: OBGYN CLINIC | Facility: CLINIC | Age: 30
End: 2025-01-09

## 2025-01-09 VITALS — WEIGHT: 159.4 LBS | BODY MASS INDEX: 22.82 KG/M2 | HEIGHT: 70 IN

## 2025-01-09 DIAGNOSIS — Z90.79 STATUS POST BILATERAL SALPINGECTOMY: Primary | ICD-10-CM

## 2025-01-09 PROCEDURE — 99024 POSTOP FOLLOW-UP VISIT: CPT | Performed by: OBSTETRICS & GYNECOLOGY

## 2025-01-09 NOTE — PROGRESS NOTES
"Subjective    Patient is a 30 yo  who presents today s/p laparoscopic bilateral salpingectomy on 25. Extensive adhesive disease of omental adhesions to the anterior abdominal wall was noted at the time of her surgery; lysis of adhesions was performed. She reports a small amount of right flank pain, which feels like an occasional stabbing sensation; she thinks it feels like a strained muscle. She otherwise denies any abdominal pain. She has no other concerns and otherwise feels well.    OB History          1    Para   1    Term   1            AB        Living   1         SAB        IAB        Ectopic        Multiple        Live Births   1           Obstetric Comments   C- section                      Objective    Vitals:    25 1004   Weight: 72.3 kg (159 lb 6.4 oz)   Height: 5' 10\" (1.778 m)        Physical Exam  Constitutional:       Appearance: She is well-developed.   Cardiovascular:      Rate and Rhythm: Normal rate.   Pulmonary:      Effort: Pulmonary effort is normal. No respiratory distress.   Abdominal:      Palpations: Abdomen is soft.      Tenderness: There is no abdominal tenderness. There is no guarding or rebound.      Comments: Incisions C/D/I   Skin:     General: Skin is warm and dry.          Assessment    Patient Active Problem List   Diagnosis    Chronic pain of right knee    Patella kyle    Tachycardia, unspecified    Alpha-1-antitrypsin deficiency (HCC)    Cigarette nicotine dependence without complication    Smoking    Status post bilateral salpingectomy        Plan  - Incisions C/D/I  - Instructed patient to return to office if pain is persistent  "

## 2025-01-16 ENCOUNTER — TELEPHONE (OUTPATIENT)
Age: 30
End: 2025-01-16

## 2025-01-16 NOTE — LETTER
January 16, 2025     Patient: Jakob Bell  YOB: 1995  Date of Visit: 1/16/2025      To Whom it May Concern:    Jakob Bell is under my professional care. Jakob may return to work on 1/22/25 with no restrictions. .    If you have any questions or concerns, please don't hesitate to call.         Sincerely,          Oksana Sharpe

## 2025-01-16 NOTE — TELEPHONE ENCOUNTER
Patient is requesting a return to work note stating she has no restrictions. She will be returning on 1/22/24.

## (undated) DEVICE — INTENDED FOR TISSUE SEPARATION, AND OTHER PROCEDURES THAT REQUIRE A SHARP SURGICAL BLADE TO PUNCTURE OR CUT.: Brand: BARD-PARKER SAFETY BLADES SIZE 11, STERILE

## (undated) DEVICE — SCD SEQUENTIAL COMPRESSION COMFORT SLEEVE MEDIUM KNEE LENGTH: Brand: KENDALL SCD

## (undated) DEVICE — INSUFFLATION NEEDLE TO ESTABLISH PNEUMOPERITONEUM.: Brand: INSUFFLATION NEEDLE

## (undated) DEVICE — TROCAR: Brand: KII SLEEVE

## (undated) DEVICE — GLOVE INDICATOR PI UNDERGLOVE SZ 7 BLUE

## (undated) DEVICE — PVC URETHRAL CATHETER: Brand: DOVER

## (undated) DEVICE — PREMIUM DRY TRAY LF: Brand: MEDLINE INDUSTRIES, INC.

## (undated) DEVICE — CHLORAPREP HI-LITE 26ML ORANGE

## (undated) DEVICE — DRAPE EQUIPMENT RF WAND

## (undated) DEVICE — BLUE HEAT SCOPE WARMER

## (undated) DEVICE — ENSEAL X1 TISSUE SEALER, CURVED JAW, 37 CM SHAFT LENGTH: Brand: ENSEAL

## (undated) DEVICE — GLOVE PI ULTRA TOUCH SZ.6.5

## (undated) DEVICE — [HIGH FLOW INSUFFLATOR,  DO NOT USE IF PACKAGE IS DAMAGED,  KEEP DRY,  KEEP AWAY FROM SUNLIGHT,  PROTECT FROM HEAT AND RADIOACTIVE SOURCES.]: Brand: PNEUMOSURE

## (undated) DEVICE — BETHLEHEM UNIVERSAL GYN LAP PK: Brand: CARDINAL HEALTH

## (undated) DEVICE — SUT MONOCRYL 4-0 PS-2 27 IN Y426H

## (undated) DEVICE — EXOFIN PRECISION PEN HIGH VISCOSITY TOPICAL SKIN ADHESIVE: Brand: EXOFIN PRECISION PEN, 1G